# Patient Record
Sex: FEMALE
[De-identification: names, ages, dates, MRNs, and addresses within clinical notes are randomized per-mention and may not be internally consistent; named-entity substitution may affect disease eponyms.]

---

## 2022-04-15 ENCOUNTER — NURSE TRIAGE (OUTPATIENT)
Dept: OTHER | Facility: CLINIC | Age: 52
End: 2022-04-15

## 2022-04-15 NOTE — TELEPHONE ENCOUNTER
Subjective: Caller states \"I tried to contact my PCP office but thy were closed, can I still go to the ER\"     Current Symptoms: Pt advised to seek medical attention within 4 hours, pt PCP office closed, pt advised to go to ED in previous triage. Proceed with alternate dispo. Patient/caller agrees to proceed to [facility] Emergency Department    This triage is a result of a call to [address] [place] [address]. Please do not respond to the triage nurse through this encounter. Any subsequent communication should be directly with the patient. Reason for Disposition  Moran Caller has already spoken with another triager or PCP (or office), and has further questions and triager able to answer questions.     Protocols used: NO CONTACT OR DUPLICATE CONTACT CALL-ADULT-OH

## 2022-04-15 NOTE — TELEPHONE ENCOUNTER
Reason for Disposition   SEVERE back pain (e.g., excruciating, unable to do any normal activities) and not improved after pain medicine and CARE ADVICE    Protocols used: BACK PAIN-ADULT-OH    Subjective: Caller states \"I am in severe pain\"     Current Symptoms: Caller is having severe back pain radiating down L leg. She was seen by PCP 3 days ago and started on steroids and muscle relaxer. Pain is continuing to worsen and caller is having difficulty walking. Onset: 3 days    Associated Symptoms: back pain    Pain Severity: 10/10    Temperature:NA    What has been tried: Steroid, muscle relaxer, heat/cold    LMP: NA Pregnant: NA    Recommended disposition: See today    Care advice provided, patient verbalizes understanding; denies any other questions or concerns; instructed to call back for any new or worsening symptoms. Will call PCP now    This triage is a result of a call to [address] [place] [address]. Please do not respond to the triage nurse through this encounter. Any subsequent communication should be directly with the patient.

## 2022-04-25 ENCOUNTER — NURSE TRIAGE (OUTPATIENT)
Dept: OTHER | Facility: CLINIC | Age: 52
End: 2022-04-25

## 2022-04-25 NOTE — TELEPHONE ENCOUNTER
Subjective: Caller states \"About 2 weeks ago I started having back pain. By Friday I had to go to ER and was found to have a bulging disk and pinched nerve. Am I suppose to see a neurologist, neuro surgeon, or orthopedic doctor. \"     Current Symptoms:   Back pain,  Unable to stand straight  Unable to stand longer than 15 minutes  Pinching pain to buttocks radiates down legs    Onset: 2 weeks ago; gradual    Associated Symptoms: NA    Pain Severity: moderate, interferes with ADLS    Temperature: denies    What has been tried: ibuprofen, went to ER last Friday    LMP: NA Pregnant: NA    Recommended disposition: See HCP within 4 Hours (or PCP triage)    Care advice provided, patient verbalizes understanding; denies any other questions or concerns; instructed to call back for any new or worsening symptoms. Patient/caller agrees to follow-up with PCP     This triage is a result of a call to [address] [place] [address]. Please do not respond to the triage nurse through this encounter. Any subsequent communication should be directly with the patient. Reason for Disposition   [1] SEVERE back pain (e.g., excruciating, unable to do any normal activities) AND [2] not improved 2 hours after pain medicine    Answer Assessment - Initial Assessment Questions  1. REASON FOR CALL or QUESTION: \"What is your reason for calling today? \" or \"How can I best help you? \" or \"What question do you have that I can help answer? \"      *No Answer*    Protocols used: BACK PAIN-ADULT-AH, INFORMATION ONLY CALL - NO TRIAGE-ADULT-OH

## 2023-06-28 DIAGNOSIS — I10 ESSENTIAL (PRIMARY) HYPERTENSION: ICD-10-CM

## 2023-06-28 RX ORDER — LISINOPRIL 20 MG/1
TABLET ORAL
Qty: 90 TABLET | Refills: 1 | Status: SHIPPED | OUTPATIENT
Start: 2023-06-28 | End: 2023-12-19

## 2023-06-28 RX ORDER — AMLODIPINE BESYLATE 5 MG/1
TABLET ORAL
Qty: 90 TABLET | Refills: 1 | Status: SHIPPED | OUTPATIENT
Start: 2023-06-28 | End: 2023-12-19

## 2023-06-30 DIAGNOSIS — Z87.19 PERSONAL HISTORY OF OTHER DISEASES OF THE DIGESTIVE SYSTEM: ICD-10-CM

## 2023-06-30 DIAGNOSIS — J30.2 OTHER SEASONAL ALLERGIC RHINITIS: ICD-10-CM

## 2023-06-30 NOTE — TELEPHONE ENCOUNTER
Requested Prescriptions     Pending Prescriptions Disp Refills    pantoprazole (ProtoNix) 40 mg EC tablet [Pharmacy Med Name: PANTOPRAZOLE SOD DR 40 MG TAB] 90 tablet 1     Sig: TAKE 1 TABLET BY MOUTH EVERY DAY    fluticasone (Flonase) 50 mcg/actuation nasal spray [Pharmacy Med Name: FLUTICASONE PROP 50 MCG SPRAY] 48 mL 1     Sig: SPRAY 2 SPRAYS INTO EACH NOSTRIL EVERY DAY

## 2023-07-03 RX ORDER — PANTOPRAZOLE SODIUM 40 MG/1
TABLET, DELAYED RELEASE ORAL
Qty: 90 TABLET | Refills: 1 | Status: SHIPPED | OUTPATIENT
Start: 2023-07-03 | End: 2024-02-28

## 2023-07-03 RX ORDER — FLUTICASONE PROPIONATE 50 MCG
SPRAY, SUSPENSION (ML) NASAL
Qty: 48 ML | Refills: 1 | Status: SHIPPED | OUTPATIENT
Start: 2023-07-03

## 2023-08-19 DIAGNOSIS — E78.5 HYPERLIPIDEMIA, UNSPECIFIED: ICD-10-CM

## 2023-08-21 RX ORDER — ATORVASTATIN CALCIUM 10 MG/1
10 TABLET, FILM COATED ORAL DAILY
Qty: 90 TABLET | Refills: 1 | Status: SHIPPED | OUTPATIENT
Start: 2023-08-21 | End: 2024-02-28

## 2023-10-26 ENCOUNTER — OFFICE VISIT (OUTPATIENT)
Dept: PRIMARY CARE | Facility: CLINIC | Age: 53
End: 2023-10-26
Payer: COMMERCIAL

## 2023-10-26 VITALS
TEMPERATURE: 98.1 F | WEIGHT: 187 LBS | RESPIRATION RATE: 16 BRPM | HEART RATE: 60 BPM | OXYGEN SATURATION: 98 % | SYSTOLIC BLOOD PRESSURE: 124 MMHG | DIASTOLIC BLOOD PRESSURE: 82 MMHG | BODY MASS INDEX: 31.12 KG/M2

## 2023-10-26 DIAGNOSIS — M67.431 GANGLION CYST OF WRIST, RIGHT: Primary | ICD-10-CM

## 2023-10-26 DIAGNOSIS — J32.9 SINOBRONCHITIS: ICD-10-CM

## 2023-10-26 DIAGNOSIS — J40 SINOBRONCHITIS: ICD-10-CM

## 2023-10-26 PROBLEM — I10 HTN (HYPERTENSION): Status: ACTIVE | Noted: 2023-10-26

## 2023-10-26 PROBLEM — Z20.822 SUSPECTED COVID-19 VIRUS INFECTION: Status: ACTIVE | Noted: 2023-10-26

## 2023-10-26 PROCEDURE — 3074F SYST BP LT 130 MM HG: CPT | Performed by: NURSE PRACTITIONER

## 2023-10-26 PROCEDURE — 99213 OFFICE O/P EST LOW 20 MIN: CPT | Performed by: NURSE PRACTITIONER

## 2023-10-26 PROCEDURE — 4004F PT TOBACCO SCREEN RCVD TLK: CPT | Performed by: NURSE PRACTITIONER

## 2023-10-26 PROCEDURE — 3079F DIAST BP 80-89 MM HG: CPT | Performed by: NURSE PRACTITIONER

## 2023-10-26 RX ORDER — ALBUTEROL SULFATE 90 UG/1
2 AEROSOL, METERED RESPIRATORY (INHALATION) EVERY 4 HOURS PRN
Qty: 6.7 G | Refills: 5 | Status: SHIPPED | OUTPATIENT
Start: 2023-10-26 | End: 2024-10-25

## 2023-10-26 RX ORDER — BENZONATATE 100 MG/1
100 CAPSULE ORAL 3 TIMES DAILY PRN
Qty: 30 CAPSULE | Refills: 0 | Status: SHIPPED | OUTPATIENT
Start: 2023-10-26 | End: 2023-11-25

## 2023-10-26 RX ORDER — CEFDINIR 300 MG/1
300 CAPSULE ORAL 2 TIMES DAILY
Qty: 20 CAPSULE | Refills: 0 | Status: SHIPPED | OUTPATIENT
Start: 2023-10-26 | End: 2023-11-05

## 2023-10-26 RX ORDER — CETIRIZINE HYDROCHLORIDE 10 MG/1
1 TABLET ORAL DAILY
COMMUNITY

## 2023-10-26 RX ORDER — CELECOXIB 200 MG/1
1 CAPSULE ORAL
COMMUNITY
Start: 2022-05-03

## 2023-10-26 RX ORDER — ASCORBIC ACID 125 MG
1 TABLET,CHEWABLE ORAL DAILY
COMMUNITY

## 2023-10-26 ASSESSMENT — ENCOUNTER SYMPTOMS
SORE THROAT: 1
SINUS COMPLAINT: 1
HEADACHES: 1
COUGH: 1
SINUS PRESSURE: 1

## 2023-10-26 NOTE — PROGRESS NOTES
Subjective   Patient ID: Rubi Leal is a 53 y.o. female who presents for Sinus Problem and cyst on right wrist she noticed today at work.    Sinus Problem  This is a new problem. The current episode started in the past 7 days. The problem has been gradually worsening since onset. There has been no fever. Associated symptoms include congestion, coughing, headaches, sinus pressure, sneezing and a sore throat. Pertinent negatives include no ear pain. Treatments tried: Zyrtec and Flonase. The treatment provided no relief.      Today she noticed yellow/green congestion. She took advil this afternoon which helped slightly.     She also noticed a large bruised lump on her left inner wrist yesterday, but never had any injury. She has some discomfort, but no major pain or decreased ROM. Hx of carpal tunnel surgery with Dr. Huffman.    Review of Systems   HENT:  Positive for congestion, sinus pressure, sneezing and sore throat. Negative for ear pain.    Respiratory:  Positive for cough.    Neurological:  Positive for headaches.       Objective   /82 (BP Location: Left arm, Patient Position: Sitting, BP Cuff Size: Adult)   Pulse 60   Temp 36.7 °C (98.1 °F) (Temporal)   Resp 16   Wt 84.8 kg (187 lb)   SpO2 98%   BMI 31.12 kg/m²     Physical Exam  Constitutional:       General: She is not in acute distress.  HENT:      Head: Normocephalic and atraumatic.      Comments: Tenderness on palpation of maxillary sinuses       Right Ear: Ear canal and external ear normal. Tympanic membrane is bulging. Tympanic membrane is not erythematous.      Left Ear: Ear canal and external ear normal. Tympanic membrane is bulging. Tympanic membrane is not erythematous.      Nose: Congestion and rhinorrhea present. Rhinorrhea is clear and purulent.      Mouth/Throat:      Mouth: Mucous membranes are moist.      Pharynx: Oropharynx is clear.   Cardiovascular:      Rate and Rhythm: Normal rate and regular rhythm.   Pulmonary:       Effort: Pulmonary effort is normal.      Breath sounds: Wheezing (clears with cough) present.   Musculoskeletal:      Right wrist: Swelling and tenderness (palmar/medial aspect) present.   Lymphadenopathy:      Cervical: Cervical adenopathy present.   Skin:     General: Skin is warm and dry.   Neurological:      Mental Status: She is alert.         Assessment/Plan   Problem List Items Addressed This Visit       Sinobronchitis    Relevant Medications    cefdinir (Omnicef) 300 mg capsule    benzonatate (Tessalon) 100 mg capsule    albuterol (Proventil HFA) 90 mcg/actuation inhaler     Other Visit Diagnoses       Ganglion cyst of wrist, right    -  Primary          Patient Instructions   Patient to start taking cefdinir as ordered, and tessalon, albuterol, tylenol and ibuprofen as needed. Encouraged to see Dr. Nathanael PARISH for possible injury to wrist. Call the office if no improvement by Monday. Follow-up with PCP in 1-2 weeks as needed.

## 2023-11-03 NOTE — PATIENT INSTRUCTIONS
Patient to start taking cefdinir as ordered, and tessalon, albuterol, tylenol and ibuprofen as needed. Encouraged to see Dr. Nathanael GARCIA for possible injury to wrist. Call the office if no improvement by Monday. Follow-up with PCP in 1-2 weeks as needed.

## 2023-12-19 DIAGNOSIS — I10 ESSENTIAL (PRIMARY) HYPERTENSION: ICD-10-CM

## 2023-12-19 RX ORDER — AMLODIPINE BESYLATE 5 MG/1
TABLET ORAL
Qty: 90 TABLET | Refills: 1 | Status: SHIPPED | OUTPATIENT
Start: 2023-12-19

## 2023-12-19 RX ORDER — LISINOPRIL 20 MG/1
TABLET ORAL
Qty: 90 TABLET | Refills: 1 | Status: SHIPPED | OUTPATIENT
Start: 2023-12-19

## 2024-01-05 ENCOUNTER — OFFICE VISIT (OUTPATIENT)
Dept: PRIMARY CARE | Facility: CLINIC | Age: 54
End: 2024-01-05
Payer: COMMERCIAL

## 2024-01-05 VITALS
SYSTOLIC BLOOD PRESSURE: 138 MMHG | TEMPERATURE: 98 F | DIASTOLIC BLOOD PRESSURE: 82 MMHG | HEART RATE: 78 BPM | WEIGHT: 179 LBS | BODY MASS INDEX: 29.82 KG/M2 | OXYGEN SATURATION: 97 % | RESPIRATION RATE: 18 BRPM | HEIGHT: 65 IN

## 2024-01-05 DIAGNOSIS — J01.90 ACUTE BACTERIAL SINUSITIS: ICD-10-CM

## 2024-01-05 DIAGNOSIS — R05.1 ACUTE COUGH: ICD-10-CM

## 2024-01-05 DIAGNOSIS — H10.31 ACUTE BACTERIAL CONJUNCTIVITIS OF RIGHT EYE: ICD-10-CM

## 2024-01-05 DIAGNOSIS — H60.331 ACUTE SWIMMER'S EAR OF RIGHT SIDE: Primary | ICD-10-CM

## 2024-01-05 DIAGNOSIS — B96.89 ACUTE BACTERIAL SINUSITIS: ICD-10-CM

## 2024-01-05 LAB
POC BINAX EXPIRATION: NORMAL
POC BINAX NOW COVID SERIAL NUMBER: NORMAL
POC RAPID INFLUENZA A: NEGATIVE
POC RAPID STREP: NEGATIVE
POC SARS-COV-2 AG BINAX: NORMAL

## 2024-01-05 PROCEDURE — 87804 INFLUENZA ASSAY W/OPTIC: CPT | Performed by: NURSE PRACTITIONER

## 2024-01-05 PROCEDURE — 87811 SARS-COV-2 COVID19 W/OPTIC: CPT | Performed by: NURSE PRACTITIONER

## 2024-01-05 PROCEDURE — 87880 STREP A ASSAY W/OPTIC: CPT | Performed by: NURSE PRACTITIONER

## 2024-01-05 PROCEDURE — 3079F DIAST BP 80-89 MM HG: CPT | Performed by: NURSE PRACTITIONER

## 2024-01-05 PROCEDURE — 99214 OFFICE O/P EST MOD 30 MIN: CPT | Performed by: NURSE PRACTITIONER

## 2024-01-05 PROCEDURE — 3075F SYST BP GE 130 - 139MM HG: CPT | Performed by: NURSE PRACTITIONER

## 2024-01-05 RX ORDER — CIPROFLOXACIN HYDROCHLORIDE 3 MG/ML
1 SOLUTION/ DROPS OPHTHALMIC 4 TIMES DAILY
Qty: 2.5 ML | Refills: 0 | Status: SHIPPED | OUTPATIENT
Start: 2024-01-05 | End: 2024-01-12

## 2024-01-05 RX ORDER — CIPROFLOXACIN AND DEXAMETHASONE 3; 1 MG/ML; MG/ML
4 SUSPENSION/ DROPS AURICULAR (OTIC) 2 TIMES DAILY
Qty: 7.5 ML | Refills: 0 | Status: SHIPPED | OUTPATIENT
Start: 2024-01-05 | End: 2024-01-12

## 2024-01-05 RX ORDER — AZITHROMYCIN 250 MG/1
TABLET, FILM COATED ORAL
Qty: 6 TABLET | Refills: 0 | Status: SHIPPED | OUTPATIENT
Start: 2024-01-05 | End: 2024-01-10

## 2024-01-05 ASSESSMENT — ENCOUNTER SYMPTOMS
SORE THROAT: 1
COUGH: 1

## 2024-01-05 NOTE — PROGRESS NOTES
"Subjective   Patient ID: Rubi Leal is a 53 y.o. female who presents for Eye Drainage and URI.  URI   This is a new problem. The current episode started today. Associated symptoms include congestion, coughing and a sore throat.    Initially started 2 weeks ago with mild sore throat and cough. Over the last 2 days symptoms are much worse with congestion, right ear pain, right swollen throat sensation, and right eye scratchy/bhumi sensation.  Review of Systems   HENT:  Positive for congestion and sore throat.    Respiratory:  Positive for cough.    Objective   /82   Pulse 78   Temp 36.7 °C (98 °F)   Resp 18   Ht 1.651 m (5' 5\")   Wt 81.2 kg (179 lb)   SpO2 97%   BMI 29.79 kg/m²   Physical Exam  Vitals reviewed.   Constitutional:       Appearance: Normal appearance.   HENT:      Head: Normocephalic.   Eyes:      Conjunctiva/sclera: Conjunctivae normal.   Cardiovascular:      Rate and Rhythm: Normal rate and regular rhythm.      Pulses: Normal pulses.   Pulmonary:      Breath sounds: Normal breath sounds.   Abdominal:      General: Bowel sounds are normal.      Palpations: Abdomen is soft.   Musculoskeletal:      Cervical back: Neck supple.   Lymphadenopathy:      Cervical: Cervical adenopathy (mild anterior cervical and tonsillar adenitis) present.   Skin:     General: Skin is warm and dry.   Neurological:      General: No focal deficit present.      Mental Status: She is alert and oriented to person, place, and time.   Psychiatric:         Mood and Affect: Mood normal.         Thought Content: Thought content normal.     Assessment/Plan   1. Acute swimmer's ear of right side        2. Acute cough  POCT Rapid Strep A manually resulted    POCT Influenza A/B manually resulted    POCT BinaxNOW Covid-19 Ag Card manually resulted      3. Acute bacterial conjunctivitis of right eye        Follow-up as needed if symptoms not improving after treatment. Avoid touching your face or eyes. Use warm water eye " compresses using a clean washcloth each time.

## 2024-01-31 ENCOUNTER — OFFICE VISIT (OUTPATIENT)
Dept: PRIMARY CARE | Facility: CLINIC | Age: 54
End: 2024-01-31
Payer: COMMERCIAL

## 2024-01-31 VITALS
BODY MASS INDEX: 30.12 KG/M2 | HEART RATE: 100 BPM | WEIGHT: 181 LBS | TEMPERATURE: 98.2 F | DIASTOLIC BLOOD PRESSURE: 80 MMHG | OXYGEN SATURATION: 97 % | SYSTOLIC BLOOD PRESSURE: 136 MMHG | RESPIRATION RATE: 18 BRPM

## 2024-01-31 DIAGNOSIS — J01.00 ACUTE NON-RECURRENT MAXILLARY SINUSITIS: Primary | ICD-10-CM

## 2024-01-31 DIAGNOSIS — J02.9 SORE THROAT: ICD-10-CM

## 2024-01-31 DIAGNOSIS — H60.331 ACUTE SWIMMER'S EAR OF RIGHT SIDE: ICD-10-CM

## 2024-01-31 LAB — POC RAPID STREP: NEGATIVE

## 2024-01-31 PROCEDURE — 3075F SYST BP GE 130 - 139MM HG: CPT | Performed by: NURSE PRACTITIONER

## 2024-01-31 PROCEDURE — 99213 OFFICE O/P EST LOW 20 MIN: CPT | Performed by: NURSE PRACTITIONER

## 2024-01-31 PROCEDURE — 3079F DIAST BP 80-89 MM HG: CPT | Performed by: NURSE PRACTITIONER

## 2024-01-31 PROCEDURE — 87651 STREP A DNA AMP PROBE: CPT

## 2024-01-31 PROCEDURE — 87880 STREP A ASSAY W/OPTIC: CPT | Performed by: NURSE PRACTITIONER

## 2024-01-31 RX ORDER — CIPROFLOXACIN AND DEXAMETHASONE 3; 1 MG/ML; MG/ML
4 SUSPENSION/ DROPS AURICULAR (OTIC) 2 TIMES DAILY
Qty: 7.5 ML | Refills: 0 | Status: SHIPPED | OUTPATIENT
Start: 2024-01-31 | End: 2024-02-07

## 2024-01-31 RX ORDER — DOXYCYCLINE 100 MG/1
100 CAPSULE ORAL 2 TIMES DAILY
Qty: 14 CAPSULE | Refills: 0 | Status: SHIPPED | OUTPATIENT
Start: 2024-01-31 | End: 2024-02-07

## 2024-01-31 ASSESSMENT — ENCOUNTER SYMPTOMS
FATIGUE: 0
RHINORRHEA: 1
VOMITING: 0
FEVER: 0
CHILLS: 0
DIARRHEA: 0
SLEEP DISTURBANCE: 0
SORE THROAT: 1
CONSTIPATION: 0
NAUSEA: 0
COUGH: 1
APPETITE CHANGE: 0
ACTIVITY CHANGE: 0

## 2024-01-31 NOTE — PROGRESS NOTES
Subjective   Patient ID: Rubi Leal is a 53 y.o. female who presents for Sore Throat. Declines Covid testing.    Sore throat x two weeks  Ear pain x3-4 days  Nasal congestion/ Drainag  No fever or chills  No GI issus      1-5-24 Took zpack; felt like it helped        Sore Throat   Episode onset: 4 days. There has been no fever. Associated symptoms include congestion, coughing and ear pain. Pertinent negatives include no diarrhea or vomiting.        Review of Systems   Constitutional:  Negative for activity change, appetite change, chills, fatigue and fever.   HENT:  Positive for congestion, ear pain, postnasal drip, rhinorrhea, sneezing and sore throat.    Respiratory:  Positive for cough.    Gastrointestinal:  Negative for constipation, diarrhea, nausea and vomiting.   Psychiatric/Behavioral:  Negative for sleep disturbance.        Objective   /80   Pulse 100   Temp 36.8 °C (98.2 °F)   Resp 18   Wt 82.1 kg (181 lb)   SpO2 97%   BMI 30.12 kg/m²     Physical Exam  Vitals reviewed.   Constitutional:       Appearance: Normal appearance.   HENT:      Head: Normocephalic.      Right Ear: Tympanic membrane, ear canal and external ear normal. Swelling and tenderness present. No drainage.      Left Ear: Tympanic membrane, ear canal and external ear normal.      Nose: Mucosal edema and congestion present.      Right Turbinates: Swollen.      Left Turbinates: Swollen.      Mouth/Throat:      Lips: Pink.      Mouth: Mucous membranes are moist.   Eyes:      Extraocular Movements: Extraocular movements intact.      Conjunctiva/sclera: Conjunctivae normal.      Pupils: Pupils are equal, round, and reactive to light.   Cardiovascular:      Rate and Rhythm: Normal rate and regular rhythm.      Pulses: Normal pulses.      Heart sounds: Normal heart sounds.   Pulmonary:      Effort: Pulmonary effort is normal.      Breath sounds: Normal breath sounds.   Musculoskeletal:      Cervical back: Normal range of motion.    Skin:     General: Skin is warm.      Capillary Refill: Capillary refill takes less than 2 seconds.   Neurological:      General: No focal deficit present.      Mental Status: She is alert and oriented to person, place, and time.   Psychiatric:         Mood and Affect: Mood normal.         Behavior: Behavior normal.         Assessment/Plan   Problem List Items Addressed This Visit             ICD-10-CM    Acute swimmer's ear of right side H60.331     Ear drops given for otits externa; Use as directed  Can use tylenol as needed for pain  Follow up with PCP if not improving  ER for any worsening of symptoms         Relevant Medications    doxycycline (Vibramycin) 100 mg capsule    Acute non-recurrent maxillary sinusitis - Primary J01.00    Relevant Medications    ciprofloxacin-dexamethasone (CiproDEX) otic suspension     Other Visit Diagnoses         Codes    Sore throat     J02.9    Relevant Orders    POCT Rapid Strep A manually resulted (Completed)    Group A Streptococcus, PCR

## 2024-01-31 NOTE — ASSESSMENT & PLAN NOTE
Antibiotics given for acute sinusitis; Take full course until completed  Encouraged daily use of Flonase and Zyrtec for symptom support  Follow up with PCP if not improving over the next 3-4 days  ER for any uncontrolled fevers, SOB, difficulty breathing or worsening of symptoms

## 2024-01-31 NOTE — ASSESSMENT & PLAN NOTE
Ear drops given for otits externa; Use as directed  Can use tylenol as needed for pain  Follow up with PCP if not improving  ER for any worsening of symptoms

## 2024-02-01 LAB — S PYO DNA THROAT QL NAA+PROBE: NOT DETECTED

## 2024-02-05 ENCOUNTER — LAB (OUTPATIENT)
Dept: LAB | Facility: CLINIC | Age: 54
End: 2024-02-05
Payer: COMMERCIAL

## 2024-02-05 DIAGNOSIS — D75.1 POLYCYTHEMIA: ICD-10-CM

## 2024-02-05 LAB
ALBUMIN SERPL BCP-MCNC: 4.9 G/DL (ref 3.4–5)
ALP SERPL-CCNC: 89 U/L (ref 33–110)
ALT SERPL W P-5'-P-CCNC: 20 U/L (ref 7–45)
ANION GAP SERPL CALC-SCNC: 14 MMOL/L (ref 10–20)
AST SERPL W P-5'-P-CCNC: 15 U/L (ref 9–39)
BASOPHILS # BLD AUTO: 0.03 X10*3/UL (ref 0–0.1)
BASOPHILS NFR BLD AUTO: 0.4 %
BILIRUB SERPL-MCNC: 0.4 MG/DL (ref 0–1.2)
BUN SERPL-MCNC: 11 MG/DL (ref 6–23)
CALCIUM SERPL-MCNC: 10 MG/DL (ref 8.6–10.3)
CHLORIDE SERPL-SCNC: 103 MMOL/L (ref 98–107)
CO2 SERPL-SCNC: 28 MMOL/L (ref 21–32)
CREAT SERPL-MCNC: 0.75 MG/DL (ref 0.5–1.05)
EGFRCR SERPLBLD CKD-EPI 2021: >90 ML/MIN/1.73M*2
EOSINOPHIL # BLD AUTO: 0.27 X10*3/UL (ref 0–0.7)
EOSINOPHIL NFR BLD AUTO: 3.2 %
ERYTHROCYTE [DISTWIDTH] IN BLOOD BY AUTOMATED COUNT: 12.3 % (ref 11.5–14.5)
FERRITIN SERPL-MCNC: 68 NG/ML (ref 8–150)
GLUCOSE SERPL-MCNC: 148 MG/DL (ref 74–99)
HCT VFR BLD AUTO: 46.1 % (ref 36–46)
HGB BLD-MCNC: 15.7 G/DL (ref 12–16)
IMM GRANULOCYTES # BLD AUTO: 0.02 X10*3/UL (ref 0–0.7)
IMM GRANULOCYTES NFR BLD AUTO: 0.2 % (ref 0–0.9)
IRON SATN MFR SERPL: 38 % (ref 25–45)
IRON SERPL-MCNC: 88 UG/DL (ref 35–150)
LYMPHOCYTES # BLD AUTO: 2.45 X10*3/UL (ref 1.2–4.8)
LYMPHOCYTES NFR BLD AUTO: 29 %
MCH RBC QN AUTO: 31 PG (ref 26–34)
MCHC RBC AUTO-ENTMCNC: 34.1 G/DL (ref 32–36)
MCV RBC AUTO: 91 FL (ref 80–100)
MONOCYTES # BLD AUTO: 0.53 X10*3/UL (ref 0.1–1)
MONOCYTES NFR BLD AUTO: 6.3 %
NEUTROPHILS # BLD AUTO: 5.16 X10*3/UL (ref 1.2–7.7)
NEUTROPHILS NFR BLD AUTO: 60.9 %
PLATELET # BLD AUTO: 248 X10*3/UL (ref 150–450)
POTASSIUM SERPL-SCNC: 3.7 MMOL/L (ref 3.5–5.3)
PROT SERPL-MCNC: 7.1 G/DL (ref 6.4–8.2)
RBC # BLD AUTO: 5.07 X10*6/UL (ref 4–5.2)
SODIUM SERPL-SCNC: 141 MMOL/L (ref 136–145)
TIBC SERPL-MCNC: 232 UG/DL (ref 240–445)
UIBC SERPL-MCNC: 144 UG/DL (ref 110–370)
WBC # BLD AUTO: 8.5 X10*3/UL (ref 4.4–11.3)

## 2024-02-05 PROCEDURE — 85025 COMPLETE CBC W/AUTO DIFF WBC: CPT

## 2024-02-05 PROCEDURE — 80053 COMPREHEN METABOLIC PANEL: CPT

## 2024-02-05 PROCEDURE — 83540 ASSAY OF IRON: CPT

## 2024-02-05 PROCEDURE — 36415 COLL VENOUS BLD VENIPUNCTURE: CPT

## 2024-02-05 PROCEDURE — 82728 ASSAY OF FERRITIN: CPT

## 2024-02-09 ENCOUNTER — OFFICE VISIT (OUTPATIENT)
Dept: HEMATOLOGY/ONCOLOGY | Facility: CLINIC | Age: 54
End: 2024-02-09
Payer: COMMERCIAL

## 2024-02-09 VITALS
BODY MASS INDEX: 30.34 KG/M2 | TEMPERATURE: 97.5 F | WEIGHT: 182.32 LBS | OXYGEN SATURATION: 96 % | HEART RATE: 62 BPM | SYSTOLIC BLOOD PRESSURE: 148 MMHG | RESPIRATION RATE: 14 BRPM | DIASTOLIC BLOOD PRESSURE: 77 MMHG

## 2024-02-09 DIAGNOSIS — D75.1 POLYCYTHEMIA: ICD-10-CM

## 2024-02-09 PROCEDURE — 3077F SYST BP >= 140 MM HG: CPT | Performed by: PHYSICIAN ASSISTANT

## 2024-02-09 PROCEDURE — 99214 OFFICE O/P EST MOD 30 MIN: CPT | Performed by: PHYSICIAN ASSISTANT

## 2024-02-09 PROCEDURE — 3078F DIAST BP <80 MM HG: CPT | Performed by: PHYSICIAN ASSISTANT

## 2024-02-09 ASSESSMENT — ENCOUNTER SYMPTOMS
HEMATOLOGIC/LYMPHATIC NEGATIVE: 1
VOMITING: 0
NUMBNESS: 0
HEMATURIA: 0
SPEECH DIFFICULTY: 0
NECK STIFFNESS: 0
ABDOMINAL PAIN: 0
DIAPHORESIS: 0
CONFUSION: 0
CARDIOVASCULAR NEGATIVE: 1
DIZZINESS: 0
NECK PAIN: 0
NAUSEA: 0
RESPIRATORY NEGATIVE: 1
CHILLS: 0
DIARRHEA: 0
DIFFICULTY URINATING: 0
APPETITE CHANGE: 0
EYE PROBLEMS: 0
SCLERAL ICTERUS: 0
ABDOMINAL DISTENTION: 0
DECREASED CONCENTRATION: 0
FREQUENCY: 0
CONSTIPATION: 0
CONSTITUTIONAL NEGATIVE: 1
HEADACHES: 0
WOUND: 0

## 2024-02-09 ASSESSMENT — PAIN SCALES - GENERAL: PAINLEVEL: 0-NO PAIN

## 2024-02-09 NOTE — PROGRESS NOTES
Patient ID: Rubi Leal is a 53 y.o. female.  Primary care physician: Dr. Linette Vinson     Interval History:   Mrs. Leal is a 50yo female who is here for hematology consultation.  Dr. Linette Vinson is her PCP.  Dr. Vinson has referred her here for hematology consultation for evaluation and management of polycythemia.    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      Subjective      HPI  -States that she was just off antibiotics for ear infection.   -Continues to smoke about one pack per day.   -denies any other new health concerns.     Social History     Tobacco Use    Smoking status: Every Day     Packs/day: 1     Types: Cigarettes     Passive exposure: Current    Smokeless tobacco: Never   Vaping Use    Vaping Use: Never used        Objective    BMI:   Body mass index is 30.34 kg/m².     Vitals:   Visit Vitals  Smoking Status Every Day        Review of Systems   Constitutional: Negative.  Negative for appetite change, chills and diaphoresis.   HENT:   Negative for lump/mass, mouth sores and nosebleeds.    Eyes:  Negative for eye problems and icterus.   Respiratory: Negative.     Cardiovascular: Negative.    Gastrointestinal:  Negative for abdominal distention, abdominal pain, constipation, diarrhea, nausea and vomiting.   Genitourinary:  Negative for bladder incontinence, difficulty urinating, frequency and hematuria.     Musculoskeletal:  Negative for gait problem, neck pain and neck stiffness.   Skin:  Negative for itching, rash and wound.   Neurological:  Negative for dizziness, gait problem, headaches, numbness and speech difficulty.   Hematological: Negative.    Psychiatric/Behavioral:  Negative for confusion and decreased concentration.         Physical Exam  HENT:      Head: Normocephalic.      Nose: Nose normal.      Mouth/Throat:      Mouth: Mucous membranes are moist.   Eyes:      Pupils: Pupils are equal, round, and reactive to light.   Cardiovascular:      Rate and Rhythm: Normal rate and regular rhythm.      Pulses: Normal pulses.      Heart sounds: Normal heart sounds.   Pulmonary:      Effort: Pulmonary effort is normal.      Breath sounds: Normal breath sounds.   Musculoskeletal:         General: Normal range of motion.   Skin:     General: Skin is warm and dry.   Neurological:      General: No focal deficit present.      Mental Status: She is alert and oriented to person, place, and time.   Psychiatric:         Mood and Affect: Mood normal.         Behavior: Behavior normal.       Labs:  Lab Results   Component Value Date    WBC 8.5 02/05/2024    NEUTROABS 5.16 02/05/2024    IGABSOL 0.02 02/05/2024    LYMPHSABS 2.45 02/05/2024    MONOSABS 0.53 02/05/2024    EOSABS 0.27 02/05/2024    BASOSABS 0.03 02/05/2024    RBC 5.07 02/05/2024    MCV 91 02/05/2024    MCHC 34.1 02/05/2024    HGB 15.7 02/05/2024    HCT 46.1 (H) 02/05/2024     02/05/2024       Lab Results   Component Value Date    CREATININE 0.75 02/05/2024    BUN 11 02/05/2024    EGFR >90 02/05/2024     02/05/2024    K 3.7 02/05/2024     02/05/2024    CO2 28 02/05/2024      Lab Results   Component Value Date    ALT 20 02/05/2024    AST 15 02/05/2024    ALKPHOS 89 02/05/2024    BILITOT 0.4 02/05/2024      Lab Results   Component Value Date    TSH 0.89 03/02/2021     Lab Results   Component Value Date    TSH 0.89 03/02/2021    THYROIDPAB 58  03/02/2021     Lab Results   Component Value Date    IRON 88 02/05/2024    TIBC 232 (L) 02/05/2024    FERRITIN 68 02/05/2024      Performance Status:  Asymptomatic    Assessment/Plan      1)polycythemia  --since 2013 she has had polycythemia  -the 2 main diagnoses to consider would be hereditary hemochromatosis and primary polycythemia vera  -we checked labs today: creatinine 0.6, AST 13, ALT 20, alk phos 56  -, EPO 7.7  wbc 9.7, hgb 16.0, plt 239,000, ANC 7200  -TIBC 235, sat 17%, ferritin 28  -PAWAN 2 negative, BCR-ABL negative  -MP and calreticulin pending  -hereditary hemochromatosis mutation panel pending  -if all above negative, then because she has been smoking for 30 years now, she probably has secondary polycythemia secondary to smoking/chronic hypoxia    2/5/2021 here for interval followup  -on initial visit, 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  -hgb was 15 on Jan 26  -today we will check COMP (to monitor liver function) and iron panel + ferritin (iron indices are monitored in setting of hemochromatosis)  -today's labs reviewed- saturation 34%, ferritin 76  -reviewed criteria for initiation of therapeutic phlebotomy--when saturation >60% and/or ferritin >500  -pt does not meet criteria--so will just observe annually for now  -she knows to come 1-2 days prior to each office visit to get her labs drawn  -limited physical exam was performed today  -25 minutes were spent directly face-to-face with patient, 15 minutes were spent in reviewing data ( labs), reconciling meds, ordering future labs and appointment,  as well as documenting and finalizing clinical information in the medical record for this patient for total of 40 minutes     2/4/2022 here for interval follow up via telephone  -had labs drawn a few days ago including CBC, COMP, iron panel + ferritin  -reviewed results--ferritin 44, sat 37%  -discussed implications--as long as ferritin <500 and sat <60%, initiation of  therapeutic phlebotomy would not be necessary yet  -will continue to follow annually  -20 minutes total spent    2/3/2023 Here for interval followup via phone.   -Labs done on 2/1/23: WBC 7.6, ANC 4.20, Hgb 15.2, MCV 91, platelets 275,000, ferritin 49, iron saturation 48%, ALT 19, AST 16  -Reviewed criteria of therapeutic phlebotomy with patient again today: ferritin above 500 and/or iron saturation above 60%. Patient does not meet the criteria yet.   -Advised patient to avoid iron supplements.   -I will see her back in one year with labs done a few days prior to the next visit.     2/9/2024 Here for interval follow up  -Patient has been seeing us annually for hereditary hematochromatosis.   -Labs done on 2/5/24: Hgb 15.7, MCV 91, ferritin 68, iron saturation 38%, AST 15, ALT 20  -Patient does not meet the criteria for phlebotomy yet.   -Patient continues to smoke about one pack per day.   -Advised patient to stop smoking,   -We will continue to see patient back yearly.   -She will have CBC, CMP, iron panel and ferritin done a few days prior to the next visit.       Problem List Items Addressed This Visit    None  Visit Diagnoses         Codes    Polycythemia     D75.1    Relevant Orders    CBC and Auto Differential (Completed)    Comprehensive Metabolic Panel (Completed)    Ferritin (Completed)    Iron and TIBC (Completed)    Iron and TIBC    Ferritin    CBC and Auto Differential    Comprehensive Metabolic Panel    Clinic Appointment Request Follow Up; HANS ARCHER; [id number]                 Hans Archer PA-C

## 2024-02-28 DIAGNOSIS — Z87.19 PERSONAL HISTORY OF OTHER DISEASES OF THE DIGESTIVE SYSTEM: ICD-10-CM

## 2024-02-28 DIAGNOSIS — E78.5 HYPERLIPIDEMIA, UNSPECIFIED: ICD-10-CM

## 2024-02-28 RX ORDER — PANTOPRAZOLE SODIUM 40 MG/1
TABLET, DELAYED RELEASE ORAL
Qty: 90 TABLET | Refills: 1 | Status: SHIPPED | OUTPATIENT
Start: 2024-02-28

## 2024-02-28 RX ORDER — ATORVASTATIN CALCIUM 10 MG/1
10 TABLET, FILM COATED ORAL DAILY
Qty: 90 TABLET | Refills: 1 | Status: SHIPPED | OUTPATIENT
Start: 2024-02-28

## 2024-03-25 ENCOUNTER — APPOINTMENT (OUTPATIENT)
Dept: RADIOLOGY | Facility: HOSPITAL | Age: 54
End: 2024-03-25
Payer: COMMERCIAL

## 2024-03-25 ENCOUNTER — HOSPITAL ENCOUNTER (EMERGENCY)
Facility: HOSPITAL | Age: 54
Discharge: HOME | End: 2024-03-25
Attending: EMERGENCY MEDICINE
Payer: COMMERCIAL

## 2024-03-25 VITALS
TEMPERATURE: 97 F | BODY MASS INDEX: 29.99 KG/M2 | OXYGEN SATURATION: 95 % | WEIGHT: 180 LBS | HEIGHT: 65 IN | SYSTOLIC BLOOD PRESSURE: 182 MMHG | HEART RATE: 90 BPM | RESPIRATION RATE: 18 BRPM | DIASTOLIC BLOOD PRESSURE: 87 MMHG

## 2024-03-25 DIAGNOSIS — R10.30 LOWER ABDOMINAL PAIN: Primary | ICD-10-CM

## 2024-03-25 DIAGNOSIS — R11.2 NAUSEA AND VOMITING, UNSPECIFIED VOMITING TYPE: ICD-10-CM

## 2024-03-25 LAB
ALBUMIN SERPL BCP-MCNC: 5.4 G/DL (ref 3.4–5)
ALP SERPL-CCNC: 97 U/L (ref 33–110)
ALT SERPL W P-5'-P-CCNC: 22 U/L (ref 7–45)
ANION GAP SERPL CALC-SCNC: 19 MMOL/L (ref 10–20)
APPEARANCE UR: CLEAR
AST SERPL W P-5'-P-CCNC: 18 U/L (ref 9–39)
BASOPHILS # BLD AUTO: 0.06 X10*3/UL (ref 0–0.1)
BASOPHILS NFR BLD AUTO: 0.3 %
BILIRUB SERPL-MCNC: 0.6 MG/DL (ref 0–1.2)
BILIRUB UR STRIP.AUTO-MCNC: NEGATIVE MG/DL
BUN SERPL-MCNC: 6 MG/DL (ref 6–23)
CALCIUM SERPL-MCNC: 10 MG/DL (ref 8.6–10.3)
CHLORIDE SERPL-SCNC: 103 MMOL/L (ref 98–107)
CO2 SERPL-SCNC: 23 MMOL/L (ref 21–32)
COLOR UR: ABNORMAL
CREAT SERPL-MCNC: 0.66 MG/DL (ref 0.5–1.05)
EGFRCR SERPLBLD CKD-EPI 2021: >90 ML/MIN/1.73M*2
EOSINOPHIL # BLD AUTO: 0.01 X10*3/UL (ref 0–0.7)
EOSINOPHIL NFR BLD AUTO: 0 %
ERYTHROCYTE [DISTWIDTH] IN BLOOD BY AUTOMATED COUNT: 12.8 % (ref 11.5–14.5)
FLUAV RNA RESP QL NAA+PROBE: NOT DETECTED
FLUBV RNA RESP QL NAA+PROBE: NOT DETECTED
GLUCOSE SERPL-MCNC: 228 MG/DL (ref 74–99)
GLUCOSE UR STRIP.AUTO-MCNC: ABNORMAL MG/DL
HCT VFR BLD AUTO: 52.4 % (ref 36–46)
HGB BLD-MCNC: 17.2 G/DL (ref 12–16)
IMM GRANULOCYTES # BLD AUTO: 0.11 X10*3/UL (ref 0–0.7)
IMM GRANULOCYTES NFR BLD AUTO: 0.5 % (ref 0–0.9)
KETONES UR STRIP.AUTO-MCNC: ABNORMAL MG/DL
LEUKOCYTE ESTERASE UR QL STRIP.AUTO: NEGATIVE
LIPASE SERPL-CCNC: 6 U/L (ref 9–82)
LYMPHOCYTES # BLD AUTO: 0.97 X10*3/UL (ref 1.2–4.8)
LYMPHOCYTES NFR BLD AUTO: 4.7 %
MCH RBC QN AUTO: 29.7 PG (ref 26–34)
MCHC RBC AUTO-ENTMCNC: 32.8 G/DL (ref 32–36)
MCV RBC AUTO: 90 FL (ref 80–100)
MONOCYTES # BLD AUTO: 0.61 X10*3/UL (ref 0.1–1)
MONOCYTES NFR BLD AUTO: 3 %
NEUTROPHILS # BLD AUTO: 18.73 X10*3/UL (ref 1.2–7.7)
NEUTROPHILS NFR BLD AUTO: 91.5 %
NITRITE UR QL STRIP.AUTO: NEGATIVE
NRBC BLD-RTO: 0 /100 WBCS (ref 0–0)
PH UR STRIP.AUTO: 7 [PH]
PLATELET # BLD AUTO: 336 X10*3/UL (ref 150–450)
POTASSIUM SERPL-SCNC: 3.9 MMOL/L (ref 3.5–5.3)
PROT SERPL-MCNC: 8.1 G/DL (ref 6.4–8.2)
PROT UR STRIP.AUTO-MCNC: ABNORMAL MG/DL
RBC # BLD AUTO: 5.8 X10*6/UL (ref 4–5.2)
RBC # UR STRIP.AUTO: ABNORMAL /UL
RBC #/AREA URNS AUTO: NORMAL /HPF
SARS-COV-2 RNA RESP QL NAA+PROBE: NOT DETECTED
SODIUM SERPL-SCNC: 141 MMOL/L (ref 136–145)
SP GR UR STRIP.AUTO: 1.01
SQUAMOUS #/AREA URNS AUTO: NORMAL /HPF
UROBILINOGEN UR STRIP.AUTO-MCNC: <2 MG/DL
WBC # BLD AUTO: 20.5 X10*3/UL (ref 4.4–11.3)
WBC #/AREA URNS AUTO: NORMAL /HPF

## 2024-03-25 PROCEDURE — 2550000001 HC RX 255 CONTRASTS: Performed by: EMERGENCY MEDICINE

## 2024-03-25 PROCEDURE — 99284 EMERGENCY DEPT VISIT MOD MDM: CPT | Mod: 25

## 2024-03-25 PROCEDURE — 2500000004 HC RX 250 GENERAL PHARMACY W/ HCPCS (ALT 636 FOR OP/ED)

## 2024-03-25 PROCEDURE — 96376 TX/PRO/DX INJ SAME DRUG ADON: CPT

## 2024-03-25 PROCEDURE — 96375 TX/PRO/DX INJ NEW DRUG ADDON: CPT

## 2024-03-25 PROCEDURE — 83690 ASSAY OF LIPASE: CPT

## 2024-03-25 PROCEDURE — 99285 EMERGENCY DEPT VISIT HI MDM: CPT | Performed by: EMERGENCY MEDICINE

## 2024-03-25 PROCEDURE — 85025 COMPLETE CBC W/AUTO DIFF WBC: CPT

## 2024-03-25 PROCEDURE — 80053 COMPREHEN METABOLIC PANEL: CPT

## 2024-03-25 PROCEDURE — 96361 HYDRATE IV INFUSION ADD-ON: CPT

## 2024-03-25 PROCEDURE — 87636 SARSCOV2 & INF A&B AMP PRB: CPT

## 2024-03-25 PROCEDURE — 81001 URINALYSIS AUTO W/SCOPE: CPT

## 2024-03-25 PROCEDURE — 96374 THER/PROPH/DIAG INJ IV PUSH: CPT | Mod: 59

## 2024-03-25 PROCEDURE — 74177 CT ABD & PELVIS W/CONTRAST: CPT

## 2024-03-25 PROCEDURE — 36415 COLL VENOUS BLD VENIPUNCTURE: CPT

## 2024-03-25 PROCEDURE — 74177 CT ABD & PELVIS W/CONTRAST: CPT | Performed by: STUDENT IN AN ORGANIZED HEALTH CARE EDUCATION/TRAINING PROGRAM

## 2024-03-25 RX ORDER — ONDANSETRON HYDROCHLORIDE 2 MG/ML
4 INJECTION, SOLUTION INTRAVENOUS ONCE
Status: COMPLETED | OUTPATIENT
Start: 2024-03-25 | End: 2024-03-25

## 2024-03-25 RX ORDER — MORPHINE SULFATE 4 MG/ML
4 INJECTION, SOLUTION INTRAMUSCULAR; INTRAVENOUS ONCE
Status: COMPLETED | OUTPATIENT
Start: 2024-03-25 | End: 2024-03-25

## 2024-03-25 RX ORDER — ONDANSETRON 4 MG/1
4 TABLET, ORALLY DISINTEGRATING ORAL EVERY 8 HOURS PRN
Qty: 15 TABLET | Refills: 0 | Status: SHIPPED | OUTPATIENT
Start: 2024-03-25

## 2024-03-25 RX ADMIN — SODIUM CHLORIDE, POTASSIUM CHLORIDE, SODIUM LACTATE AND CALCIUM CHLORIDE 1000 ML: 600; 310; 30; 20 INJECTION, SOLUTION INTRAVENOUS at 22:30

## 2024-03-25 RX ADMIN — MORPHINE SULFATE 4 MG: 4 INJECTION, SOLUTION INTRAMUSCULAR; INTRAVENOUS at 19:12

## 2024-03-25 RX ADMIN — ONDANSETRON 4 MG: 2 INJECTION INTRAMUSCULAR; INTRAVENOUS at 19:12

## 2024-03-25 RX ADMIN — MORPHINE SULFATE 4 MG: 4 INJECTION, SOLUTION INTRAMUSCULAR; INTRAVENOUS at 22:30

## 2024-03-25 RX ADMIN — ONDANSETRON 4 MG: 2 INJECTION INTRAMUSCULAR; INTRAVENOUS at 22:30

## 2024-03-25 RX ADMIN — SODIUM CHLORIDE 1000 ML: 9 INJECTION, SOLUTION INTRAVENOUS at 19:12

## 2024-03-25 RX ADMIN — IOHEXOL 75 ML: 350 INJECTION, SOLUTION INTRAVENOUS at 20:26

## 2024-03-25 ASSESSMENT — LIFESTYLE VARIABLES
HAVE YOU EVER FELT YOU SHOULD CUT DOWN ON YOUR DRINKING: NO
EVER FELT BAD OR GUILTY ABOUT YOUR DRINKING: NO
EVER HAD A DRINK FIRST THING IN THE MORNING TO STEADY YOUR NERVES TO GET RID OF A HANGOVER: NO
TOTAL SCORE: 0
HAVE PEOPLE ANNOYED YOU BY CRITICIZING YOUR DRINKING: NO

## 2024-03-25 ASSESSMENT — PAIN SCALES - GENERAL
PAINLEVEL_OUTOF10: 10 - WORST POSSIBLE PAIN
PAINLEVEL_OUTOF10: 7
PAINLEVEL_OUTOF10: 8
PAINLEVEL_OUTOF10: 5 - MODERATE PAIN

## 2024-03-25 ASSESSMENT — PAIN DESCRIPTION - LOCATION
LOCATION: ABDOMEN

## 2024-03-25 ASSESSMENT — COLUMBIA-SUICIDE SEVERITY RATING SCALE - C-SSRS
1. IN THE PAST MONTH, HAVE YOU WISHED YOU WERE DEAD OR WISHED YOU COULD GO TO SLEEP AND NOT WAKE UP?: NO
2. HAVE YOU ACTUALLY HAD ANY THOUGHTS OF KILLING YOURSELF?: NO
6. HAVE YOU EVER DONE ANYTHING, STARTED TO DO ANYTHING, OR PREPARED TO DO ANYTHING TO END YOUR LIFE?: NO

## 2024-03-25 ASSESSMENT — PAIN - FUNCTIONAL ASSESSMENT
PAIN_FUNCTIONAL_ASSESSMENT: 0-10

## 2024-03-25 ASSESSMENT — PAIN DESCRIPTION - PAIN TYPE: TYPE: ACUTE PAIN

## 2024-03-25 NOTE — ED PROVIDER NOTES
EMERGENCY DEPARTMENT ENCOUNTER      Pt Name: Rubi Leal  MRN: [id number]  Birthdate 1970  Date of evaluation: 3/25/2024  Provider: Gus Angulo MD    CHIEF COMPLAINT       Chief Complaint   Patient presents with    Abdominal Pain     Abd pain, n/v, diarrhea since she woke up at 0800 today; denies CP         HISTORY OF PRESENT ILLNESS    HPI  Patient is a 53-year-old female presenting with abdominal pain.  Acute onset approximately 8:00 this morning when she woke up.  Pain is 8/10, sharp, radiating along her belt line, without consistent leaving or exacerbating factors.  She reports approximately 10 episodes of nonbloody emesis and inability to tolerate oral intake.  She has had several episodes of nonbloody diarrhea.  She reports chills but denies fever, URI symptoms, chest pain, shortness of breath    Nursing Notes were reviewed.    PAST MEDICAL HISTORY     Past Medical History:   Diagnosis Date    Calculus of kidney     Multiple kidney stones    Encounter for follow-up examination after completed treatment for conditions other than malignant neoplasm 11/17/2021    Hospital discharge follow-up    Generalized abdominal pain 12/28/2021    Generalized abdominal pain    Menopausal and female climacteric states 12/17/2019    Perimenopausal    Osteophyte, vertebrae     Osteophyte of spine    Other muscle spasm 02/04/2022    Trapezius muscle spasm    Personal history of other diseases of the musculoskeletal system and connective tissue     History of degenerative disc disease    Personal history of other diseases of the nervous system and sense organs     History of tension headache    Personal history of other specified conditions     History of abdominal pain    Serous retinal detachment, bilateral     Bilateral retinal detachment    Strain of muscle, fascia and tendon at neck level, initial encounter 03/09/2022    Strain of sternocleidomastoid muscle, initial encounter    Strain of other muscles, fascia and  tendons at shoulder and upper arm level, left arm, initial encounter 03/09/2022    Strain of left trapezius muscle, initial encounter         SURGICAL HISTORY       Past Surgical History:   Procedure Laterality Date    OTHER SURGICAL HISTORY  09/04/2019    Tonsillectomy with adenoidectomy    OTHER SURGICAL HISTORY  09/04/2019    Lithotripsy    OTHER SURGICAL HISTORY  09/04/2019    Carpal tunnel surgery    OTHER SURGICAL HISTORY  02/03/2021    Retinal detachment repair         CURRENT MEDICATIONS       Previous Medications    ALBUTEROL (PROVENTIL HFA) 90 MCG/ACTUATION INHALER    Inhale 2 puffs every 4 hours if needed for wheezing or shortness of breath.    AMLODIPINE (NORVASC) 5 MG TABLET    TAKE 1 TABLET BY MOUTH EVERY DAY    ATORVASTATIN (LIPITOR) 10 MG TABLET    TAKE 1 TABLET BY MOUTH EVERY DAY    CELECOXIB (CELEBREX) 200 MG CAPSULE    Take 1 capsule (200 mg) by mouth once daily.    CETIRIZINE (ZYRTEC) 10 MG TABLET    Take 1 tablet (10 mg) by mouth once daily.    CHOLECALCIFEROL, VITAMIN D3, 25 MCG (1,000 UNIT) TABLET,CHEWABLE    Chew 1 tablet (25 mcg) once daily.    FLUTICASONE (FLONASE) 50 MCG/ACTUATION NASAL SPRAY    SPRAY 2 SPRAYS INTO EACH NOSTRIL EVERY DAY    LISINOPRIL 20 MG TABLET    TAKE 1 TABLET BY MOUTH EVERY DAY    PANTOPRAZOLE (PROTONIX) 40 MG EC TABLET    TAKE 1 TABLET BY MOUTH EVERY DAY       ALLERGIES     Cefdinir, Erythromycin, and Penicillins    FAMILY HISTORY     No family history on file.       SOCIAL HISTORY       Social History     Socioeconomic History    Marital status:      Spouse name: None    Number of children: None    Years of education: None    Highest education level: None   Occupational History    None   Tobacco Use    Smoking status: Every Day     Packs/day: 1     Types: Cigarettes     Passive exposure: Current    Smokeless tobacco: Never   Vaping Use    Vaping Use: Never used   Substance and Sexual Activity    Alcohol use: None    Drug use: None    Sexual activity: None    Other Topics Concern    None   Social History Narrative    None     Social Determinants of Health     Financial Resource Strain: Not on file   Food Insecurity: Not on file   Transportation Needs: Not on file   Physical Activity: Not on file   Stress: Not on file   Social Connections: Not on file   Intimate Partner Violence: Not on file   Housing Stability: Not on file       SCREENINGS                        PHYSICAL EXAM    (up to 7 for level 4, 8 or more for level 5)     ED Triage Vitals [03/25/24 1818]   Temperature Heart Rate Respirations BP   36.1 °C (97 °F) 87 18 (!) 181/84      Pulse Ox Temp src Heart Rate Source Patient Position   99 % -- -- Sitting      BP Location FiO2 (%)     Right arm --       Physical Exam  Constitutional:       Appearance: She is ill-appearing.      Comments:   Uncomfortable lying in bed   HENT:      Head: Normocephalic and atraumatic.      Mouth/Throat:      Pharynx: Oropharynx is clear.      Comments:  dry mucous membranes  Eyes:      General: No scleral icterus.     Pupils: Pupils are equal, round, and reactive to light.   Cardiovascular:      Rate and Rhythm: Normal rate and regular rhythm.      Heart sounds: Normal heart sounds.   Pulmonary:      Effort: Pulmonary effort is normal. No respiratory distress.      Breath sounds: Normal breath sounds.   Abdominal:      General: There is no distension.      Palpations: Abdomen is soft.      Tenderness: There is abdominal tenderness in the right lower quadrant and left lower quadrant. There is no guarding or rebound.   Skin:     General: Skin is warm and dry.   Neurological:      General: No focal deficit present.   Psychiatric:         Mood and Affect: Mood is anxious.          DIAGNOSTIC RESULTS     LABS:  Labs Reviewed   CBC WITH AUTO DIFFERENTIAL - Abnormal       Result Value    WBC 20.5 (*)     nRBC 0.0      RBC 5.80 (*)     Hemoglobin 17.2 (*)     Hematocrit 52.4 (*)     MCV 90      MCH 29.7      MCHC 32.8      RDW 12.8       Platelets 336      Neutrophils % 91.5      Immature Granulocytes %, Automated 0.5      Lymphocytes % 4.7      Monocytes % 3.0      Eosinophils % 0.0      Basophils % 0.3      Neutrophils Absolute 18.73 (*)     Immature Granulocytes Absolute, Automated 0.11      Lymphocytes Absolute 0.97 (*)     Monocytes Absolute 0.61      Eosinophils Absolute 0.01      Basophils Absolute 0.06     COMPREHENSIVE METABOLIC PANEL - Abnormal    Glucose 228 (*)     Sodium 141      Potassium 3.9      Chloride 103      Bicarbonate 23      Anion Gap 19      Urea Nitrogen 6      Creatinine 0.66      eGFR >90      Calcium 10.0      Albumin 5.4 (*)     Alkaline Phosphatase 97      Total Protein 8.1      AST 18      Bilirubin, Total 0.6      ALT 22     URINALYSIS WITH REFLEX CULTURE AND MICROSCOPIC - Abnormal    Color, Urine Straw      Appearance, Urine Clear      Specific Gravity, Urine 1.011      pH, Urine 7.0      Protein, Urine 30 (1+) (*)     Glucose, Urine 150 (2+) (*)     Blood, Urine SMALL (1+) (*)     Ketones, Urine 5 (TRACE) (*)     Bilirubin, Urine NEGATIVE      Urobilinogen, Urine <2.0      Nitrite, Urine NEGATIVE      Leukocyte Esterase, Urine NEGATIVE     LIPASE - Abnormal    Lipase 6 (*)     Narrative:     Venipuncture immediately after or during the administration of Metamizole may lead to falsely low results. Testing should be performed immediately prior to Metamizole dosing.   SARS-COV-2 AND INFLUENZA A/B PCR - Normal    Flu A Result Not Detected      Flu B Result Not Detected      Coronavirus 2019, PCR Not Detected      Narrative:     This assay has received FDA Emergency Use Authorization (EUA) and  is only authorized for the duration of time that circumstances exist to justify the authorization of the emergency use of in vitro diagnostic tests for the detection of SARS-CoV-2 virus and/or diagnosis of COVID-19 infection under section 564(b)(1) of the Act, 21 U.S.C. 360bbb-3(b)(1). Testing for SARS-CoV-2 is only recommended  for patients who meet current clinical and/or epidemiological criteria as defined by federal, state, or local public health directives. This assay is an in vitro diagnostic nucleic acid amplification test for the qualitative detection of SARS-CoV-2, Influenza A, and Influenza B from nasopharyngeal specimens and has been validated for use at [facility]. Negative results do not preclude COVID-19 infections or Influenza A/B infections, and should not be used as the sole basis for diagnosis, treatment, or other management decisions. If Influenza A/B and RSV PCR results are negative, testing for Parainfluenza virus, Adenovirus and Metapneumovirus is routinely performed for [facility] pediatric oncology and intensive care inpatients, and is available on other patients by placing an add-on request.    URINALYSIS WITH REFLEX CULTURE AND MICROSCOPIC    Narrative:     The following orders were created for panel order Urinalysis with Reflex Culture and Microscopic.  Procedure                               Abnormality         Status                     ---------                               -----------         ------                     Urinalysis with Reflex C...[[id number]]  Abnormal            Final result               Extra Urine Gray Tube[[id number]]                            In process                   Please view results for these tests on the individual orders.   EXTRA URINE GRAY TUBE   URINALYSIS MICROSCOPIC WITH REFLEX CULTURE    WBC, Urine 1-5      RBC, Urine 1-2      Squamous Epithelial Cells, Urine 1-9 (SPARSE)         All other labs were within normal range or not returned as of this dictation.    Imaging  CT abdomen pelvis w IV contrast   Final Result   1.  No acute findings in the abdomen and pelvis.   2. Hepatic steatosis.             MACRO:   None        Signed by: Matthias Marino 3/25/2024 8:59 PM   Dictation workstation:   [id number]           Procedures  Procedures     EMERGENCY  DEPARTMENT COURSE/MDM:     Diagnoses as of 03/25/24 2334   Lower abdominal pain   Nausea and vomiting, unspecified vomiting type        Medical Decision Making    History obtained for the patient.  Records including labs, imaging, notes reviewed.  Presentation concerning for possible enteritis, colitis, appendicitis.  Abdominal pain workup was initiated.  Patient was given dose of morphine, Zofran, intravenous fluid with temporary resolution of her symptoms.  In the meantime, labs were sent.  CMP notable for hyperglycemia of 228.    Hematology notable for leukocytosis 20.5 but also elevated hemoglobin of 17.2.  This may be due to hemoconcentration  rather than indication of infection or inflammatory process.  Urinalysis with small blood only.  CT demonstrated no acute findings.  No clinical evidence of infection.  Patient is afebrile, normal vital signs, no evidence for sepsis or septic shock.  This was discussed at length with the patient and her .  This is reportedly happened before but is never not resolved with pain control and Zofran.  Patient was redosed with resolution of her symptoms and subsequently passed a p.o. challenge.  She was discharged home in satisfactory condition with a prescription for Zofran and a referral to gastroenterology.  All questions answered and return precautions discussed.    Patient and or family in agreement and understanding of treatment plan.  All questions answered.      I reviewed the case with the attending ED physician. The attending ED physician agrees with the plan. Patient and/or patient´s representative was counseled regarding labs, imaging, likely diagnosis, and plan. All questions were answered.    ED Medications administered this visit:    Medications   ondansetron (Zofran) injection 4 mg (4 mg intravenous Given 3/25/24 1912)   morphine injection 4 mg (4 mg intravenous Given 3/25/24 1912)   sodium chloride 0.9 % bolus 1,000 mL (0 mL intravenous Stopped 3/25/24  2011)   iohexol (OMNIPaque) 350 mg iodine/mL solution 75 mL (75 mL intravenous Given 3/25/24 2026)   ondansetron (Zofran) injection 4 mg (4 mg intravenous Given 3/25/24 2230)   morphine injection 4 mg (4 mg intravenous Given 3/25/24 2230)   lactated Ringer's bolus 1,000 mL (1,000 mL intravenous New Bag 3/25/24 2230)       New Prescriptions from this visit:    New Prescriptions    ONDANSETRON ODT (ZOFRAN-ODT) 4 MG DISINTEGRATING TABLET    Take 1 tablet (4 mg) by mouth every 8 hours if needed for nausea or vomiting for up to 15 doses.       Follow-up:  Grace NEWELL MD  [id number] [address] [address]  [facility] [address] [phone]    Schedule an appointment as soon as possible for a visit           Final Impression:   1. Lower abdominal pain    2. Nausea and vomiting, unspecified vomiting type          (Please note that portions of this note were completed with a voice recognition program.  Efforts were made to edit the dictations but occasionally words are mis-transcribed.)     Gus Angulo MD  Resident  03/25/24 0816    The patient was seen by the resident/fellow.  I have personally performed a substantive portion of the encounter.  I have seen and examined the patient; agree with the workup, evaluation, MDM, management and diagnosis.  The care plan has been discussed with the resident; I have reviewed the resident’s note and agree with the documented findings.       Alberto Hsu, DO  03/31/24 8664

## 2024-03-26 LAB — HOLD SPECIMEN: NORMAL

## 2024-03-26 NOTE — DISCHARGE INSTRUCTIONS
You were evaluated for abdominal pain.  Your CT scan did not demonstrate any acute findings to explain her symptoms.  We recommend that you return to gastroenterology for further evaluation.  You may use Zofran as needed for nausea and vomiting.  If you develop uncontrollable pain, intractable vomiting, or other worrisome symptoms, return to the ER.

## 2024-04-01 DIAGNOSIS — Z12.39 ENCOUNTER FOR SCREENING FOR MALIGNANT NEOPLASM OF BREAST, UNSPECIFIED SCREENING MODALITY: ICD-10-CM

## 2024-04-23 ENCOUNTER — HOSPITAL ENCOUNTER (OUTPATIENT)
Dept: RADIOLOGY | Facility: CLINIC | Age: 54
Discharge: HOME | End: 2024-04-23
Payer: COMMERCIAL

## 2024-04-23 VITALS — BODY MASS INDEX: 30.32 KG/M2 | WEIGHT: 182 LBS | HEIGHT: 65 IN

## 2024-04-23 DIAGNOSIS — Z12.39 ENCOUNTER FOR SCREENING FOR MALIGNANT NEOPLASM OF BREAST, UNSPECIFIED SCREENING MODALITY: ICD-10-CM

## 2024-04-23 PROCEDURE — 77063 BREAST TOMOSYNTHESIS BI: CPT | Performed by: RADIOLOGY

## 2024-04-23 PROCEDURE — 77067 SCR MAMMO BI INCL CAD: CPT | Performed by: RADIOLOGY

## 2024-04-23 PROCEDURE — 77067 SCR MAMMO BI INCL CAD: CPT

## 2024-05-16 ENCOUNTER — APPOINTMENT (OUTPATIENT)
Dept: GASTROENTEROLOGY | Facility: CLINIC | Age: 54
End: 2024-05-16
Payer: COMMERCIAL

## 2024-06-04 ENCOUNTER — OFFICE VISIT (OUTPATIENT)
Dept: GASTROENTEROLOGY | Facility: CLINIC | Age: 54
End: 2024-06-04
Payer: COMMERCIAL

## 2024-06-04 ENCOUNTER — HOSPITAL ENCOUNTER (OUTPATIENT)
Dept: RADIOLOGY | Facility: CLINIC | Age: 54
Discharge: HOME | End: 2024-06-04
Payer: COMMERCIAL

## 2024-06-04 ENCOUNTER — LAB (OUTPATIENT)
Dept: LAB | Facility: LAB | Age: 54
End: 2024-06-04
Payer: COMMERCIAL

## 2024-06-04 VITALS
RESPIRATION RATE: 18 BRPM | HEART RATE: 63 BPM | OXYGEN SATURATION: 96 % | BODY MASS INDEX: 29.82 KG/M2 | WEIGHT: 179 LBS | HEIGHT: 65 IN | SYSTOLIC BLOOD PRESSURE: 149 MMHG | DIASTOLIC BLOOD PRESSURE: 79 MMHG

## 2024-06-04 DIAGNOSIS — F17.210 CIGARETTE SMOKER: ICD-10-CM

## 2024-06-04 DIAGNOSIS — K58.2 IRRITABLE BOWEL SYNDROME WITH BOTH CONSTIPATION AND DIARRHEA: ICD-10-CM

## 2024-06-04 DIAGNOSIS — R19.5 DARK STOOLS: ICD-10-CM

## 2024-06-04 DIAGNOSIS — R10.10 UPPER ABDOMINAL PAIN: ICD-10-CM

## 2024-06-04 DIAGNOSIS — R19.8 ALTERNATING CONSTIPATION AND DIARRHEA: ICD-10-CM

## 2024-06-04 DIAGNOSIS — R11.2 NAUSEA AND VOMITING IN ADULT: ICD-10-CM

## 2024-06-04 DIAGNOSIS — K58.2 IRRITABLE BOWEL SYNDROME WITH BOTH CONSTIPATION AND DIARRHEA: Primary | ICD-10-CM

## 2024-06-04 LAB
CRP SERPL-MCNC: 0.29 MG/DL
ERYTHROCYTE [DISTWIDTH] IN BLOOD BY AUTOMATED COUNT: 12.5 % (ref 11.5–14.5)
ERYTHROCYTE [SEDIMENTATION RATE] IN BLOOD BY WESTERGREN METHOD: 10 MM/H (ref 0–30)
HCT VFR BLD AUTO: 50.1 % (ref 36–46)
HGB BLD-MCNC: 16.7 G/DL (ref 12–16)
MCH RBC QN AUTO: 30.7 PG (ref 26–34)
MCHC RBC AUTO-ENTMCNC: 33.3 G/DL (ref 32–36)
MCV RBC AUTO: 92 FL (ref 80–100)
NRBC BLD-RTO: 0 /100 WBCS (ref 0–0)
PLATELET # BLD AUTO: 257 X10*3/UL (ref 150–450)
RBC # BLD AUTO: 5.44 X10*6/UL (ref 4–5.2)
WBC # BLD AUTO: 6.7 X10*3/UL (ref 4.4–11.3)

## 2024-06-04 PROCEDURE — 85652 RBC SED RATE AUTOMATED: CPT

## 2024-06-04 PROCEDURE — 74018 RADEX ABDOMEN 1 VIEW: CPT

## 2024-06-04 PROCEDURE — 36415 COLL VENOUS BLD VENIPUNCTURE: CPT

## 2024-06-04 PROCEDURE — 3078F DIAST BP <80 MM HG: CPT | Performed by: NURSE PRACTITIONER

## 2024-06-04 PROCEDURE — 99214 OFFICE O/P EST MOD 30 MIN: CPT | Performed by: NURSE PRACTITIONER

## 2024-06-04 PROCEDURE — 74018 RADEX ABDOMEN 1 VIEW: CPT | Performed by: RADIOLOGY

## 2024-06-04 PROCEDURE — 3077F SYST BP >= 140 MM HG: CPT | Performed by: NURSE PRACTITIONER

## 2024-06-04 PROCEDURE — 85027 COMPLETE CBC AUTOMATED: CPT

## 2024-06-04 PROCEDURE — 86140 C-REACTIVE PROTEIN: CPT

## 2024-06-04 NOTE — PROGRESS NOTES
"Subjective   Patient ID: Rubi Leal is a 53 y.o. female who presents for Abdominal Pain (States that she has been having episodes of abd pain, vomiting for years. Within the first hour that she is awake, she will start having abd pain that is accompanied with either constipation/diarrhea that is followed by vomiting. Zofran doesn't help. The pain is localized in the middle of abd, describes as stabbing pain. Last EGD/colon in 2021.).  [facility]  LOV 12/28/2021 by Dr. Álvarez:  Rubi Leal is a 50yo female with a PMH of HTN, HLD, vitamin D deficiency, and seasonal allergies who presents to clinic for multiple GI complaints including abdominal pain/discomfort, N/V, and diarrhea. 3-4 severe episodes/yr. Etiology of symptoms are unclear. Organic cause has essentially been ruled out. She has had a significant GI work-up including EGD, colonoscopy, CT imaging, and blood work. All tests have essentially been normal. Symptoms do not seem to be biliary in nature, however, will obtain abdominal ultrasound. Stress seems to be playing a significant role. Discussed the importance of stress reduction (daily walks, \"me\" time). Patient also going to keep a food journal, avoid dairy, and start with a low FODMAP diet. Of note, reviewed CT and there does not seem to be a large stool burden. Will consider TCA if no improvement in symptoms.    Endoscopic investigations:  She underwent EGD and colonoscopy in 5/2021. She was noted to have gastritis, diverticulosis, and 2 polyps. However, pathology did not show gastritis. No celiac disease. 1 polyp was adenomatous and 1 was hyperplastic. No microscopic colitis.    Patient presents to the GI clinic with a constellation of chronic GI complaints.  She reports experiencing flareups or episodes since her 20s.  These episodes happen 1-2 times a year typically with increased stress or eating chocolate.  She endorses history of lactose intolerance, does not avoid it just limits it.  During " these flareups she will experience upper abdominal pain, nausea and vomiting, diarrhea or constipation.  She will also experience diaphoresis before a bowel movement.  She endorses typically her bowel movements are varied.  She denies straining, but endorses abdominal bloating.  She is not sure if she feels fully evacuated.  She denies hematochezia or hematemesis but has experienced dark stool during her flareups.  Uses Zofran as needed with suboptimal response.  She is a daily smoker with occasional alcohol use.  No NSAIDs.  No family history of CRC or IBD.  GERD, takes PPI with good response.  No dysphagia.  Weight and appetite are stable.  Patient did not obtain abdominal ultrasound as requested at her last visit.  She did not keep a diary or try a low FODMAP diet.  Review of Systems   All other systems reviewed and are negative.      Objective   Physical Exam  Vitals reviewed.   Constitutional:       General: She is awake. She is not in acute distress.     Appearance: Normal appearance. She is not ill-appearing, toxic-appearing or diaphoretic.   HENT:      Head: Normocephalic and atraumatic.   Eyes:      General: No scleral icterus.     Pupils: Pupils are equal, round, and reactive to light.   Cardiovascular:      Rate and Rhythm: Normal rate and regular rhythm.      Heart sounds: Normal heart sounds. No murmur heard.  Pulmonary:      Effort: Pulmonary effort is normal. No respiratory distress.      Breath sounds: Normal breath sounds.   Abdominal:      General: Abdomen is protuberant. Bowel sounds are normal.      Palpations: Abdomen is soft. There is no hepatomegaly.      Tenderness: There is no abdominal tenderness.   Musculoskeletal:         General: Normal range of motion.      Cervical back: Normal range of motion.      Right lower leg: No edema.      Left lower leg: No edema.   Skin:     General: Skin is warm and dry.      Coloration: Skin is not jaundiced or pale.      Comments: Spider angiomata noted on  face and neck   Neurological:      General: No focal deficit present.      Mental Status: She is alert and oriented to person, place, and time.      Motor: No weakness.      Gait: Gait normal.   Psychiatric:         Mood and Affect: Mood normal.         Behavior: Behavior is cooperative.         Thought Content: Thought content normal.         Judgment: Judgment normal.         Assessment/Plan   Diagnoses and all orders for this visit:  53-year-old female with a constellation of chronic GI complaints.  She was seen in 2021 and did not follow through with recommendations.  Upper and lower endoscopic investigations in 2021, detailed above.  No red flag symptoms noted, clinical history suggestive of IBS-mixed.  At this time we will proceed with routine labs along with a fecal calprotectin.  Will also have the patient obtain an abdominal x-ray to rule out stool burden.  Abdominal ultrasound to rule out cholelithiasis.  Overall her symptoms seem to be precipitated by stress, recommend antistress/relaxation techniques.  -Smoking and alcohol cessation.         JESSE Centeno-CNP 06/04/24 10:19 AM

## 2024-06-06 ENCOUNTER — HOSPITAL ENCOUNTER (OUTPATIENT)
Dept: RADIOLOGY | Facility: CLINIC | Age: 54
Discharge: HOME | End: 2024-06-06
Payer: COMMERCIAL

## 2024-06-06 DIAGNOSIS — R11.2 NAUSEA AND VOMITING IN ADULT: ICD-10-CM

## 2024-06-06 DIAGNOSIS — R10.10 UPPER ABDOMINAL PAIN: ICD-10-CM

## 2024-06-06 PROCEDURE — 76700 US EXAM ABDOM COMPLETE: CPT | Performed by: RADIOLOGY

## 2024-06-06 PROCEDURE — 76700 US EXAM ABDOM COMPLETE: CPT

## 2024-06-20 DIAGNOSIS — I10 ESSENTIAL (PRIMARY) HYPERTENSION: ICD-10-CM

## 2024-06-20 RX ORDER — LISINOPRIL 20 MG/1
TABLET ORAL
Qty: 90 TABLET | Refills: 1 | Status: SHIPPED | OUTPATIENT
Start: 2024-06-20

## 2024-06-20 RX ORDER — AMLODIPINE BESYLATE 5 MG/1
TABLET ORAL
Qty: 90 TABLET | Refills: 1 | Status: SHIPPED | OUTPATIENT
Start: 2024-06-20

## 2024-06-27 ENCOUNTER — APPOINTMENT (OUTPATIENT)
Dept: GASTROENTEROLOGY | Facility: CLINIC | Age: 54
End: 2024-06-27
Payer: COMMERCIAL

## 2024-07-02 ENCOUNTER — APPOINTMENT (OUTPATIENT)
Dept: GASTROENTEROLOGY | Facility: CLINIC | Age: 54
End: 2024-07-02
Payer: COMMERCIAL

## 2024-08-30 DIAGNOSIS — Z87.19 PERSONAL HISTORY OF OTHER DISEASES OF THE DIGESTIVE SYSTEM: ICD-10-CM

## 2024-08-30 DIAGNOSIS — E78.5 HYPERLIPIDEMIA, UNSPECIFIED: ICD-10-CM

## 2024-08-30 NOTE — TELEPHONE ENCOUNTER
Pharmacy requests prescription below    Last Office Visit: Visit date not found   Next Office Visit: Visit date not found     Requested Prescriptions     Pending Prescriptions Disp Refills    atorvastatin (Lipitor) 10 mg tablet [Pharmacy Med Name: ATORVASTATIN 10 MG TABLET] 90 tablet 1     Sig: TAKE 1 TABLET BY MOUTH EVERY DAY    pantoprazole (ProtoNix) 40 mg EC tablet [Pharmacy Med Name: PANTOPRAZOLE SOD DR 40 MG TAB] 90 tablet 1     Sig: TAKE 1 TABLET BY MOUTH EVERY DAY

## 2024-09-03 RX ORDER — ATORVASTATIN CALCIUM 10 MG/1
10 TABLET, FILM COATED ORAL DAILY
Qty: 90 TABLET | Refills: 1 | Status: SHIPPED | OUTPATIENT
Start: 2024-09-03

## 2024-09-03 RX ORDER — PANTOPRAZOLE SODIUM 40 MG/1
TABLET, DELAYED RELEASE ORAL
Qty: 90 TABLET | Refills: 1 | Status: SHIPPED | OUTPATIENT
Start: 2024-09-03

## 2024-09-27 ENCOUNTER — HOSPITAL ENCOUNTER (OUTPATIENT)
Dept: RADIOLOGY | Facility: CLINIC | Age: 54
Discharge: HOME | End: 2024-09-27
Payer: COMMERCIAL

## 2024-09-27 ENCOUNTER — OFFICE VISIT (OUTPATIENT)
Dept: PRIMARY CARE | Facility: CLINIC | Age: 54
End: 2024-09-27
Payer: COMMERCIAL

## 2024-09-27 VITALS
RESPIRATION RATE: 28 BRPM | HEART RATE: 59 BPM | DIASTOLIC BLOOD PRESSURE: 84 MMHG | SYSTOLIC BLOOD PRESSURE: 122 MMHG | OXYGEN SATURATION: 99 % | WEIGHT: 176 LBS | TEMPERATURE: 98 F | BODY MASS INDEX: 29.29 KG/M2

## 2024-09-27 DIAGNOSIS — R05.1 ACUTE COUGH: ICD-10-CM

## 2024-09-27 DIAGNOSIS — R05.1 ACUTE COUGH: Primary | ICD-10-CM

## 2024-09-27 DIAGNOSIS — J32.9 SINOBRONCHITIS: ICD-10-CM

## 2024-09-27 DIAGNOSIS — J40 SINOBRONCHITIS: ICD-10-CM

## 2024-09-27 LAB — POC SARS-COV-2 AG BINAX: NORMAL

## 2024-09-27 PROCEDURE — 87635 SARS-COV-2 COVID-19 AMP PRB: CPT

## 2024-09-27 PROCEDURE — 99213 OFFICE O/P EST LOW 20 MIN: CPT | Performed by: NURSE PRACTITIONER

## 2024-09-27 PROCEDURE — 87811 SARS-COV-2 COVID19 W/OPTIC: CPT | Performed by: NURSE PRACTITIONER

## 2024-09-27 PROCEDURE — 3074F SYST BP LT 130 MM HG: CPT | Performed by: NURSE PRACTITIONER

## 2024-09-27 PROCEDURE — 71046 X-RAY EXAM CHEST 2 VIEWS: CPT

## 2024-09-27 PROCEDURE — 3079F DIAST BP 80-89 MM HG: CPT | Performed by: NURSE PRACTITIONER

## 2024-09-27 RX ORDER — METHYLPREDNISOLONE 4 MG/1
TABLET ORAL
Qty: 21 TABLET | Refills: 0 | Status: SHIPPED | OUTPATIENT
Start: 2024-09-27 | End: 2024-10-04

## 2024-09-27 RX ORDER — DOXYCYCLINE 100 MG/1
100 CAPSULE ORAL 2 TIMES DAILY
Qty: 14 CAPSULE | Refills: 0 | Status: SHIPPED | OUTPATIENT
Start: 2024-09-27 | End: 2024-10-04

## 2024-09-27 RX ORDER — ALBUTEROL SULFATE 90 UG/1
2 INHALANT RESPIRATORY (INHALATION) EVERY 4 HOURS PRN
Qty: 6.7 G | Refills: 0 | Status: SHIPPED | OUTPATIENT
Start: 2024-09-27 | End: 2024-10-27

## 2024-09-27 ASSESSMENT — ENCOUNTER SYMPTOMS
SLEEP DISTURBANCE: 1
VOMITING: 0
CONSTIPATION: 0
HEADACHES: 1
CHILLS: 0
SHORTNESS OF BREATH: 1
NAUSEA: 0
DIARRHEA: 0
APPETITE CHANGE: 0
SORE THROAT: 1
ACTIVITY CHANGE: 0
FEVER: 0
COUGH: 1

## 2024-09-27 NOTE — PROGRESS NOTES
"Subjective   Rubi Leal is a 54 y.o. female who presents for Shortness of Breath.    PT concerned for possible black mold in her home.      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   Cleaning up a lot mice droppings  Mild Nasal congestion  Scratchy throat  Post nasal drip  Headaches at times  Interrupted sleep    Used inhaler on the way here; Feeling better since she left work    Denies any hx of asthma/COPD    OTC-using inhaler     Shortness of Breath  Episode onset: 1 week. The problem has been gradually worsening. Associated symptoms include headaches and a sore throat. Pertinent negatives include no ear pain, fever or vomiting.     Review of Systems   Constitutional:  Negative for activity change, appetite change, chills and fever.   HENT:  Positive for congestion, sneezing and sore throat. Negative for ear pain.    Respiratory:  Positive for cough and shortness of breath.    Gastrointestinal:  Negative for constipation, diarrhea, nausea and vomiting.   Neurological:  Positive for headaches.   Psychiatric/Behavioral:  Positive for sleep disturbance.      Objective   Physical Exam  Vitals reviewed.   Constitutional:       Appearance: Normal appearance.   HENT:      Head: Normocephalic.   Eyes:      Extraocular Movements: Extraocular movements intact.      Conjunctiva/sclera: Conjunctivae normal.      Pupils: Pupils are equal, round, and reactive to light.   Cardiovascular:      Rate and Rhythm: Normal rate and regular rhythm.      Pulses: Normal pulses.      Heart sounds: Normal heart sounds.   Pulmonary:      Effort: Pulmonary effort is normal.      Breath sounds: Normal breath sounds.   Abdominal:      Tenderness: There is no right CVA tenderness, left CVA tenderness, guarding or rebound.   Musculoskeletal:      Cervical back: " Normal, normal range of motion and neck supple.      Thoracic back: Normal.      Lumbar back: Spasms and tenderness present. No swelling or deformity. Normal range of motion.   Skin:     General: Skin is warm.      Capillary Refill: Capillary refill takes less than 2 seconds.   Neurological:      General: No focal deficit present.      Mental Status: She is alert and oriented to person, place, and time.   Psychiatric:         Mood and Affect: Mood normal.         Behavior: Behavior normal.       /84   Pulse 59   Temp 36.7 °C (98 °F)   Resp (!) 28   Wt 79.8 kg (176 lb)   SpO2 99%   BMI 29.29 kg/m²   Assessment/Plan   Problem List Items Addressed This Visit       Sinobronchitis    Relevant Medications    methylPREDNISolone (Medrol Dospak) 4 mg tablets    albuterol (Proventil HFA) 90 mcg/actuation inhaler    doxycycline (Vibramycin) 100 mg capsule     Other Visit Diagnoses       Acute cough    -  Primary    Relevant Orders    XR chest 2 views (Completed)    POCT BinaxNOW Covid-19 Ag Card manually resulted (Completed)    Sars-CoV-2 PCR (Completed)          Order for chest xray placed  IO Covid negative; PCR to be sent. Will follow up results as needed  Reviewed supportive care for cold/Covid symptoms  Can use Flonase and Zyrtec daily for symptom support  If all testing is negative, can start antibiotic that was sent in. Take full course until completed  F/U with PCP if not improving over the next 3-5 days  ER for SOB, difficulty breathing, uncontrolled fever

## 2024-09-28 LAB — SARS-COV-2 RNA RESP QL NAA+PROBE: NOT DETECTED

## 2024-10-17 ENCOUNTER — HOSPITAL ENCOUNTER (EMERGENCY)
Facility: HOSPITAL | Age: 54
Discharge: HOME | End: 2024-10-17
Attending: EMERGENCY MEDICINE
Payer: COMMERCIAL

## 2024-10-17 ENCOUNTER — APPOINTMENT (OUTPATIENT)
Dept: CARDIOLOGY | Facility: HOSPITAL | Age: 54
End: 2024-10-17
Payer: COMMERCIAL

## 2024-10-17 ENCOUNTER — APPOINTMENT (OUTPATIENT)
Dept: RADIOLOGY | Facility: HOSPITAL | Age: 54
End: 2024-10-17
Payer: COMMERCIAL

## 2024-10-17 VITALS
TEMPERATURE: 97.2 F | HEIGHT: 65 IN | RESPIRATION RATE: 16 BRPM | HEART RATE: 74 BPM | OXYGEN SATURATION: 98 % | BODY MASS INDEX: 29.16 KG/M2 | SYSTOLIC BLOOD PRESSURE: 179 MMHG | DIASTOLIC BLOOD PRESSURE: 90 MMHG | WEIGHT: 175 LBS

## 2024-10-17 DIAGNOSIS — R11.2 NAUSEA AND VOMITING, UNSPECIFIED VOMITING TYPE: ICD-10-CM

## 2024-10-17 DIAGNOSIS — R11.15 CYCLIC VOMITING SYNDROME: Primary | ICD-10-CM

## 2024-10-17 LAB
ALBUMIN SERPL BCP-MCNC: 5.3 G/DL (ref 3.4–5)
ALP SERPL-CCNC: 106 U/L (ref 33–110)
ALT SERPL W P-5'-P-CCNC: 22 U/L (ref 7–45)
AMPHETAMINES UR QL SCN: ABNORMAL
ANION GAP SERPL CALC-SCNC: 19 MMOL/L (ref 10–20)
APPEARANCE UR: CLEAR
AST SERPL W P-5'-P-CCNC: 19 U/L (ref 9–39)
BARBITURATES UR QL SCN: ABNORMAL
BASOPHILS # BLD AUTO: 0.03 X10*3/UL (ref 0–0.1)
BASOPHILS NFR BLD AUTO: 0.2 %
BENZODIAZ UR QL SCN: ABNORMAL
BILIRUB SERPL-MCNC: 0.8 MG/DL (ref 0–1.2)
BILIRUB UR STRIP.AUTO-MCNC: NEGATIVE MG/DL
BUN SERPL-MCNC: 8 MG/DL (ref 6–23)
BZE UR QL SCN: ABNORMAL
CALCIUM SERPL-MCNC: 9.6 MG/DL (ref 8.6–10.3)
CANNABINOIDS UR QL SCN: ABNORMAL
CHLORIDE SERPL-SCNC: 101 MMOL/L (ref 98–107)
CO2 SERPL-SCNC: 21 MMOL/L (ref 21–32)
COLOR UR: COLORLESS
CREAT SERPL-MCNC: 0.67 MG/DL (ref 0.5–1.05)
EGFRCR SERPLBLD CKD-EPI 2021: >90 ML/MIN/1.73M*2
EOSINOPHIL # BLD AUTO: 0 X10*3/UL (ref 0–0.7)
EOSINOPHIL NFR BLD AUTO: 0 %
ERYTHROCYTE [DISTWIDTH] IN BLOOD BY AUTOMATED COUNT: 12.2 % (ref 11.5–14.5)
FENTANYL+NORFENTANYL UR QL SCN: ABNORMAL
GLUCOSE SERPL-MCNC: 224 MG/DL (ref 74–99)
GLUCOSE UR STRIP.AUTO-MCNC: ABNORMAL MG/DL
HCT VFR BLD AUTO: 53.3 % (ref 36–46)
HGB BLD-MCNC: 18.1 G/DL (ref 12–16)
IMM GRANULOCYTES # BLD AUTO: 0.04 X10*3/UL (ref 0–0.7)
IMM GRANULOCYTES NFR BLD AUTO: 0.3 % (ref 0–0.9)
KETONES UR STRIP.AUTO-MCNC: ABNORMAL MG/DL
LEUKOCYTE ESTERASE UR QL STRIP.AUTO: NEGATIVE
LIPASE SERPL-CCNC: 8 U/L (ref 9–82)
LYMPHOCYTES # BLD AUTO: 0.89 X10*3/UL (ref 1.2–4.8)
LYMPHOCYTES NFR BLD AUTO: 6.8 %
MCH RBC QN AUTO: 30.5 PG (ref 26–34)
MCHC RBC AUTO-ENTMCNC: 34 G/DL (ref 32–36)
MCV RBC AUTO: 90 FL (ref 80–100)
METHADONE UR QL SCN: ABNORMAL
MONOCYTES # BLD AUTO: 0.41 X10*3/UL (ref 0.1–1)
MONOCYTES NFR BLD AUTO: 3.1 %
NEUTROPHILS # BLD AUTO: 11.74 X10*3/UL (ref 1.2–7.7)
NEUTROPHILS NFR BLD AUTO: 89.6 %
NITRITE UR QL STRIP.AUTO: NEGATIVE
NRBC BLD-RTO: 0 /100 WBCS (ref 0–0)
OPIATES UR QL SCN: ABNORMAL
OXYCODONE+OXYMORPHONE UR QL SCN: ABNORMAL
PCP UR QL SCN: ABNORMAL
PH UR STRIP.AUTO: 7 [PH]
PLATELET # BLD AUTO: 298 X10*3/UL (ref 150–450)
POTASSIUM SERPL-SCNC: 4.2 MMOL/L (ref 3.5–5.3)
PROT SERPL-MCNC: 8 G/DL (ref 6.4–8.2)
PROT UR STRIP.AUTO-MCNC: ABNORMAL MG/DL
RBC # BLD AUTO: 5.94 X10*6/UL (ref 4–5.2)
RBC # UR STRIP.AUTO: ABNORMAL /UL
RBC #/AREA URNS AUTO: NORMAL /HPF
SODIUM SERPL-SCNC: 137 MMOL/L (ref 136–145)
SP GR UR STRIP.AUTO: 1.01
SQUAMOUS #/AREA URNS AUTO: NORMAL /HPF
UROBILINOGEN UR STRIP.AUTO-MCNC: NORMAL MG/DL
WBC # BLD AUTO: 13.1 X10*3/UL (ref 4.4–11.3)
WBC #/AREA URNS AUTO: NORMAL /HPF

## 2024-10-17 PROCEDURE — 93005 ELECTROCARDIOGRAM TRACING: CPT

## 2024-10-17 PROCEDURE — 96374 THER/PROPH/DIAG INJ IV PUSH: CPT

## 2024-10-17 PROCEDURE — 80307 DRUG TEST PRSMV CHEM ANLYZR: CPT

## 2024-10-17 PROCEDURE — 2500000004 HC RX 250 GENERAL PHARMACY W/ HCPCS (ALT 636 FOR OP/ED): Performed by: EMERGENCY MEDICINE

## 2024-10-17 PROCEDURE — 71045 X-RAY EXAM CHEST 1 VIEW: CPT

## 2024-10-17 PROCEDURE — 85025 COMPLETE CBC W/AUTO DIFF WBC: CPT

## 2024-10-17 PROCEDURE — 2500000001 HC RX 250 WO HCPCS SELF ADMINISTERED DRUGS (ALT 637 FOR MEDICARE OP)

## 2024-10-17 PROCEDURE — 96361 HYDRATE IV INFUSION ADD-ON: CPT

## 2024-10-17 PROCEDURE — 96375 TX/PRO/DX INJ NEW DRUG ADDON: CPT

## 2024-10-17 PROCEDURE — 71045 X-RAY EXAM CHEST 1 VIEW: CPT | Performed by: RADIOLOGY

## 2024-10-17 PROCEDURE — 99284 EMERGENCY DEPT VISIT MOD MDM: CPT | Mod: 25

## 2024-10-17 PROCEDURE — 81001 URINALYSIS AUTO W/SCOPE: CPT

## 2024-10-17 PROCEDURE — 2500000004 HC RX 250 GENERAL PHARMACY W/ HCPCS (ALT 636 FOR OP/ED)

## 2024-10-17 PROCEDURE — 36415 COLL VENOUS BLD VENIPUNCTURE: CPT

## 2024-10-17 PROCEDURE — 80053 COMPREHEN METABOLIC PANEL: CPT

## 2024-10-17 PROCEDURE — 83690 ASSAY OF LIPASE: CPT

## 2024-10-17 PROCEDURE — 99284 EMERGENCY DEPT VISIT MOD MDM: CPT | Performed by: EMERGENCY MEDICINE

## 2024-10-17 PROCEDURE — 96372 THER/PROPH/DIAG INJ SC/IM: CPT

## 2024-10-17 RX ORDER — KETOROLAC TROMETHAMINE 30 MG/ML
30 INJECTION, SOLUTION INTRAMUSCULAR; INTRAVENOUS ONCE
Status: COMPLETED | OUTPATIENT
Start: 2024-10-17 | End: 2024-10-17

## 2024-10-17 RX ORDER — ONDANSETRON 4 MG/1
4 TABLET, ORALLY DISINTEGRATING ORAL EVERY 8 HOURS PRN
Qty: 15 TABLET | Refills: 0 | Status: SHIPPED | OUTPATIENT
Start: 2024-10-17 | End: 2024-10-22 | Stop reason: SDUPTHER

## 2024-10-17 RX ORDER — DROPERIDOL 2.5 MG/ML
1.25 INJECTION, SOLUTION INTRAMUSCULAR; INTRAVENOUS ONCE
Status: COMPLETED | OUTPATIENT
Start: 2024-10-17 | End: 2024-10-17

## 2024-10-17 RX ORDER — AMLODIPINE BESYLATE 5 MG/1
5 TABLET ORAL ONCE
Status: COMPLETED | OUTPATIENT
Start: 2024-10-17 | End: 2024-10-17

## 2024-10-17 RX ORDER — ONDANSETRON HYDROCHLORIDE 2 MG/ML
4 INJECTION, SOLUTION INTRAVENOUS ONCE
Status: COMPLETED | OUTPATIENT
Start: 2024-10-17 | End: 2024-10-17

## 2024-10-17 RX ADMIN — DROPERIDOL 1.25 MG: 2.5 INJECTION, SOLUTION INTRAMUSCULAR; INTRAVENOUS at 20:37

## 2024-10-17 RX ADMIN — AMLODIPINE BESYLATE 5 MG: 5 TABLET ORAL at 21:50

## 2024-10-17 RX ADMIN — SODIUM CHLORIDE, POTASSIUM CHLORIDE, SODIUM LACTATE AND CALCIUM CHLORIDE 1000 ML: 600; 310; 30; 20 INJECTION, SOLUTION INTRAVENOUS at 18:27

## 2024-10-17 RX ADMIN — ONDANSETRON 4 MG: 2 INJECTION INTRAMUSCULAR; INTRAVENOUS at 18:27

## 2024-10-17 RX ADMIN — KETOROLAC TROMETHAMINE 30 MG: 30 INJECTION, SOLUTION INTRAMUSCULAR at 18:57

## 2024-10-17 ASSESSMENT — LIFESTYLE VARIABLES
EVER FELT BAD OR GUILTY ABOUT YOUR DRINKING: NO
EVER HAD A DRINK FIRST THING IN THE MORNING TO STEADY YOUR NERVES TO GET RID OF A HANGOVER: NO
TOTAL SCORE: 0
HAVE YOU EVER FELT YOU SHOULD CUT DOWN ON YOUR DRINKING: NO
HAVE PEOPLE ANNOYED YOU BY CRITICIZING YOUR DRINKING: NO

## 2024-10-17 ASSESSMENT — COLUMBIA-SUICIDE SEVERITY RATING SCALE - C-SSRS
6. HAVE YOU EVER DONE ANYTHING, STARTED TO DO ANYTHING, OR PREPARED TO DO ANYTHING TO END YOUR LIFE?: NO
2. HAVE YOU ACTUALLY HAD ANY THOUGHTS OF KILLING YOURSELF?: NO
1. IN THE PAST MONTH, HAVE YOU WISHED YOU WERE DEAD OR WISHED YOU COULD GO TO SLEEP AND NOT WAKE UP?: NO

## 2024-10-17 ASSESSMENT — PAIN SCALES - GENERAL
PAINLEVEL_OUTOF10: 7
PAINLEVEL_OUTOF10: 1
PAINLEVEL_OUTOF10: 1
PAINLEVEL_OUTOF10: 6

## 2024-10-17 ASSESSMENT — PAIN - FUNCTIONAL ASSESSMENT
PAIN_FUNCTIONAL_ASSESSMENT: 0-10
PAIN_FUNCTIONAL_ASSESSMENT: 0-10

## 2024-10-17 ASSESSMENT — PAIN DESCRIPTION - LOCATION
LOCATION: BACK
LOCATION: ABDOMEN

## 2024-10-17 ASSESSMENT — PAIN DESCRIPTION - PAIN TYPE
TYPE: ACUTE PAIN
TYPE: CHRONIC PAIN

## 2024-10-17 ASSESSMENT — PAIN DESCRIPTION - ORIENTATION: ORIENTATION: LOWER;MID

## 2024-10-17 ASSESSMENT — PAIN DESCRIPTION - DESCRIPTORS: DESCRIPTORS: ACHING

## 2024-10-17 NOTE — ED PROVIDER NOTES
HPI   Chief Complaint   Patient presents with    Abdominal Pain     Pt presents to the ED with diarrhea, vomiting, nausea, and mid lower abdominal pain that started at noon today. Pt does endorse using marijuana two days ago.        This is a 54 years old female patient presented to the emergency department with a chief complaint of nausea, vomiting, and abdominal pain.  Stated that she had multiple episodes of this in the past and it is secondary to cyclic vomiting syndrome.  Last time she smoked marijuana was 2 days ago.  Denies any chest pain, shortness of breath, stated that her abdominal pain is not out of ordinary and it is the same pain that she gets when she gets her symptoms.  Denies any dizziness, lightheadedness, urinary symptoms, fever, chills, body aches, weakness or focal numbness.    Review of system: As above in the HPI section.            Patient History   Past Medical History:   Diagnosis Date    Calculus of kidney     Multiple kidney stones    Encounter for follow-up examination after completed treatment for conditions other than malignant neoplasm 11/17/2021    Hospital discharge follow-up    Generalized abdominal pain 12/28/2021    Generalized abdominal pain    Menopausal and female climacteric states 12/17/2019    Perimenopausal    Osteophyte, vertebrae     Osteophyte of spine    Other muscle spasm 02/04/2022    Trapezius muscle spasm    Personal history of other diseases of the musculoskeletal system and connective tissue     History of degenerative disc disease    Personal history of other diseases of the nervous system and sense organs     History of tension headache    Personal history of other specified conditions     History of abdominal pain    Serous retinal detachment, bilateral     Bilateral retinal detachment    Strain of muscle, fascia and tendon at neck level, initial encounter 03/09/2022    Strain of sternocleidomastoid muscle, initial encounter    Strain of other muscles, fascia  and tendons at shoulder and upper arm level, left arm, initial encounter 03/09/2022    Strain of left trapezius muscle, initial encounter     Past Surgical History:   Procedure Laterality Date    OTHER SURGICAL HISTORY  09/04/2019    Tonsillectomy with adenoidectomy    OTHER SURGICAL HISTORY  09/04/2019    Lithotripsy    OTHER SURGICAL HISTORY  09/04/2019    Carpal tunnel surgery    OTHER SURGICAL HISTORY  02/03/2021    Retinal detachment repair     No family history on file.  Social History     Tobacco Use    Smoking status: Every Day     Current packs/day: 1.00     Types: Cigarettes     Passive exposure: Current    Smokeless tobacco: Never   Vaping Use    Vaping status: Never Used   Substance Use Topics    Alcohol use: Not on file    Drug use: Not on file       Physical Exam   ED Triage Vitals [10/17/24 1759]   Temperature Heart Rate Respirations BP   36.2 °C (97.2 °F) 88 18 (!) 171/98      Pulse Ox Temp Source Heart Rate Source Patient Position   98 % Temporal Monitor Sitting      BP Location FiO2 (%)     Right arm --       Physical Exam  Vitals and nursing note reviewed.   Constitutional:       General: She is not in acute distress.     Appearance: She is well-developed.   HENT:      Head: Normocephalic and atraumatic.   Eyes:      Conjunctiva/sclera: Conjunctivae normal.   Cardiovascular:      Rate and Rhythm: Normal rate and regular rhythm.      Heart sounds: No murmur heard.  Pulmonary:      Effort: Pulmonary effort is normal. No respiratory distress.      Breath sounds: Normal breath sounds.   Abdominal:      Palpations: Abdomen is soft.      Tenderness: There is generalized abdominal tenderness.      Comments: Abdomen is soft, nondistended, nontender, no guarding, rigidity, rebound.   Musculoskeletal:         General: No swelling.      Cervical back: Neck supple.   Skin:     General: Skin is warm and dry.      Capillary Refill: Capillary refill takes less than 2 seconds.   Neurological:      Mental Status:  She is alert.   Psychiatric:         Mood and Affect: Mood normal.       ED Course & MDM   Diagnoses as of 10/19/24 2225   Cyclic vomiting syndrome                 No data recorded     Olvin Coma Scale Score: 15 (10/17/24 1802 : Rosaura Hollins RN)                           Medical Decision Making  Patient seen and examined, administer Zofran and Toradol for symptoms control, administer IV fluids, stated that Zofran always help for her nausea and vomiting.  Stated that her abdominal pain is not out of ordinary and it is the same exact abdominal pain that she gets when she goes into cyclic vomiting syndrome.  Will reassess the patient after symptomatic treatment to determine disposition.    I saw and evaluated the patient. I personally obtained the key and critical portions of the history and physical exam or was physically present for key and critical portions performed by the resident/fellow. I reviewed the resident/fellow's documentation and discussed the patient with the resident/fellow. I agree with the resident/fellow's medical decision making as documented in the note.  Patient reported improvement in symptoms, workup is unremarkable, discharged on Zofran to follow-up with a primary care doctor with instruction to return to the ED if alarming symptoms arise.  Leukocytosis is likely reactive secondary to the vomiting.  Allen Brooks,          Procedure  Procedures     Allen Brooks,   10/19/24 2227

## 2024-10-18 LAB
ATRIAL RATE: 73 BPM
ATRIAL RATE: 92 BPM
HOLD SPECIMEN: NORMAL
P AXIS: 49 DEGREES
P AXIS: 68 DEGREES
P OFFSET: 202 MS
P OFFSET: 203 MS
P ONSET: 154 MS
P ONSET: 157 MS
PR INTERVAL: 128 MS
PR INTERVAL: 132 MS
Q ONSET: 220 MS
Q ONSET: 221 MS
QRS COUNT: 12 BEATS
QRS COUNT: 15 BEATS
QRS DURATION: 80 MS
QRS DURATION: 88 MS
QT INTERVAL: 226 MS
QT INTERVAL: 380 MS
QTC CALCULATION(BAZETT): 279 MS
QTC CALCULATION(BAZETT): 418 MS
QTC FREDERICIA: 260 MS
QTC FREDERICIA: 405 MS
R AXIS: 43 DEGREES
R AXIS: 52 DEGREES
T AXIS: 249 DEGREES
T AXIS: 29 DEGREES
T OFFSET: 333 MS
T OFFSET: 411 MS
VENTRICULAR RATE: 73 BPM
VENTRICULAR RATE: 92 BPM

## 2024-10-18 NOTE — DISCHARGE INSTRUCTIONS
Please return to close ED if develop any worsening symptoms including excessive chest pain, shortness of breath, persistent nausea/vomiting, blood in the vomit or stool, weakness, or dizziness.

## 2024-10-22 ENCOUNTER — OFFICE VISIT (OUTPATIENT)
Dept: PRIMARY CARE | Facility: CLINIC | Age: 54
End: 2024-10-22
Payer: COMMERCIAL

## 2024-10-22 VITALS
SYSTOLIC BLOOD PRESSURE: 128 MMHG | WEIGHT: 170 LBS | HEIGHT: 65 IN | DIASTOLIC BLOOD PRESSURE: 68 MMHG | HEART RATE: 82 BPM | OXYGEN SATURATION: 97 % | BODY MASS INDEX: 28.32 KG/M2 | RESPIRATION RATE: 18 BRPM

## 2024-10-22 DIAGNOSIS — E55.9 VITAMIN D DEFICIENCY: ICD-10-CM

## 2024-10-22 DIAGNOSIS — R73.9 BLOOD GLUCOSE ELEVATED: ICD-10-CM

## 2024-10-22 DIAGNOSIS — E78.5 HYPERLIPIDEMIA, UNSPECIFIED HYPERLIPIDEMIA TYPE: ICD-10-CM

## 2024-10-22 DIAGNOSIS — R07.9 CHEST PAIN, UNSPECIFIED TYPE: ICD-10-CM

## 2024-10-22 DIAGNOSIS — R11.15 CYCLIC VOMITING SYNDROME: Primary | ICD-10-CM

## 2024-10-22 DIAGNOSIS — F41.1 GENERALIZED ANXIETY DISORDER: ICD-10-CM

## 2024-10-22 DIAGNOSIS — I10 PRIMARY HYPERTENSION: ICD-10-CM

## 2024-10-22 DIAGNOSIS — D75.1 POLYCYTHEMIA: ICD-10-CM

## 2024-10-22 DIAGNOSIS — R73.9 ELEVATED BLOOD SUGAR: ICD-10-CM

## 2024-10-22 DIAGNOSIS — Z13.29 SCREENING FOR THYROID DISORDER: ICD-10-CM

## 2024-10-22 DIAGNOSIS — R11.2 NAUSEA AND VOMITING, UNSPECIFIED VOMITING TYPE: ICD-10-CM

## 2024-10-22 PROBLEM — K21.9 GERD (GASTROESOPHAGEAL REFLUX DISEASE): Status: ACTIVE | Noted: 2024-10-22

## 2024-10-22 PROBLEM — L98.9 SKIN LESION OF FACE: Status: RESOLVED | Noted: 2024-10-22 | Resolved: 2024-10-22

## 2024-10-22 PROBLEM — N13.30 HYDRONEPHROSIS: Status: RESOLVED | Noted: 2024-10-22 | Resolved: 2024-10-22

## 2024-10-22 PROBLEM — G56.00 CARPAL TUNNEL SYNDROME: Status: RESOLVED | Noted: 2024-10-22 | Resolved: 2024-10-22

## 2024-10-22 PROBLEM — M47.9 SPINAL OSTEOARTHRITIS: Status: ACTIVE | Noted: 2024-10-22

## 2024-10-22 PROBLEM — M25.552 HIP PAIN, BILATERAL: Status: ACTIVE | Noted: 2024-10-22

## 2024-10-22 PROBLEM — M25.551 HIP PAIN, BILATERAL: Status: ACTIVE | Noted: 2024-10-22

## 2024-10-22 PROBLEM — J30.2 SEASONAL ALLERGIES: Status: ACTIVE | Noted: 2024-10-22

## 2024-10-22 PROBLEM — M54.32 SCIATICA OF LEFT SIDE: Status: RESOLVED | Noted: 2024-10-22 | Resolved: 2024-10-22

## 2024-10-22 PROBLEM — R19.8 IRREGULAR BOWEL HABITS: Status: ACTIVE | Noted: 2024-10-22

## 2024-10-22 PROBLEM — M54.16 LUMBAR RADICULAR PAIN: Status: ACTIVE | Noted: 2024-10-22

## 2024-10-22 PROBLEM — M54.9 BACK PAIN: Status: ACTIVE | Noted: 2024-10-22

## 2024-10-22 PROBLEM — N20.0 BILATERAL KIDNEY STONES: Status: RESOLVED | Noted: 2024-10-22 | Resolved: 2024-10-22

## 2024-10-22 PROCEDURE — 99214 OFFICE O/P EST MOD 30 MIN: CPT | Performed by: NURSE PRACTITIONER

## 2024-10-22 RX ORDER — SERTRALINE HYDROCHLORIDE 50 MG/1
50 TABLET, FILM COATED ORAL DAILY
Qty: 30 TABLET | Refills: 5 | Status: SHIPPED | OUTPATIENT
Start: 2024-10-22 | End: 2024-10-24 | Stop reason: SINTOL

## 2024-10-22 RX ORDER — ONDANSETRON 4 MG/1
4 TABLET, ORALLY DISINTEGRATING ORAL EVERY 8 HOURS PRN
Qty: 30 TABLET | Refills: 0 | Status: SHIPPED | OUTPATIENT
Start: 2024-10-22 | End: 2024-11-01

## 2024-10-22 RX ORDER — LISINOPRIL 5 MG/1
5 TABLET ORAL DAILY
Qty: 100 TABLET | Refills: 3 | Status: SHIPPED | OUTPATIENT
Start: 2024-10-22 | End: 2025-11-26

## 2024-10-22 ASSESSMENT — ENCOUNTER SYMPTOMS
BLOOD IN STOOL: 0
VOMITING: 1
FEVER: 0
NERVOUS/ANXIOUS: 1
SHORTNESS OF BREATH: 1
DIARRHEA: 1
FATIGUE: 1
CONSTIPATION: 1
ABDOMINAL PAIN: 1
NAUSEA: 1
CHILLS: 0
SLEEP DISTURBANCE: 1
APPETITE CHANGE: 1
PALPITATIONS: 1
LIGHT-HEADEDNESS: 1
HEADACHES: 0

## 2024-10-22 NOTE — ASSESSMENT & PLAN NOTE
This is long-standing, but worsening on recent labs. She has never had therapeutic infusion. Advised her to call her hematologist for follow-up.

## 2024-10-22 NOTE — PROGRESS NOTES
Subjective   Rubi Leal is a 54 y.o. female who presents for Hospital Follow-up (Patient at [facility] [place]24 for Cyclic Vomiting Syndrome. Patient was discharged on Zofran and advised to follow-up with PCP. This is a recurring issue for this patient. ) and Hypertension (Patient has been having issues with blood pressure. Patient started breaking Lisinopril in half over the summer due to not being able to be seen by provider, started taking as directed this past weekend and patient has been having episodes of being light headed, blackout almost. ).  HPI  Review of Systems   Constitutional:  Positive for appetite change and fatigue. Negative for chills and fever.   Respiratory:  Positive for shortness of breath (during episodes of stress and anxiety).    Cardiovascular:  Positive for chest pain (intermittent episodes when she feels very stressed. States it feels like an elephant sitting on her chest.) and palpitations (occasional).   Gastrointestinal:  Positive for abdominal pain (cramping), constipation, diarrhea, nausea and vomiting. Negative for blood in stool.   Neurological:  Positive for syncope (Near-syncope when standing up. States she has decreased her lisinopril to 10 mg QOD d/t feeling lightheaded and like everything going black when she stands up.) and light-headedness (frequently). Negative for headaches.   Psychiatric/Behavioral:  Positive for sleep disturbance (occasional restless sleep a couple times per week.). The patient is nervous/anxious.      Objective   Physical Exam  Vitals reviewed.   Constitutional:       Appearance: Normal appearance.   HENT:      Head: Normocephalic.   Eyes:      Conjunctiva/sclera: Conjunctivae normal.   Cardiovascular:      Rate and Rhythm: Normal rate and regular rhythm.      Pulses: Normal pulses.      Heart sounds: No murmur heard.  Pulmonary:      Effort: Pulmonary effort is normal.      Breath sounds: Normal breath sounds.   Abdominal:      " General: Bowel sounds are normal.      Palpations: Abdomen is soft.   Musculoskeletal:      Cervical back: Neck supple.      Right lower leg: No edema.      Left lower leg: No edema.   Skin:     General: Skin is warm and dry.   Neurological:      General: No focal deficit present.      Mental Status: She is alert and oriented to person, place, and time.   Psychiatric:         Mood and Affect: Mood normal.         Thought Content: Thought content normal.       /68   Pulse 82   Resp 18   Ht 1.651 m (5' 5\")   Wt 77.1 kg (170 lb)   SpO2 97%   BMI 28.29 kg/m²   Assessment/Plan   Problem List Items Addressed This Visit       HLD (hyperlipidemia)    Relevant Orders    Lipid panel    Polycythemia    Current Assessment & Plan     This is long-standing, but worsening on recent labs. She has never had therapeutic infusion. Advised her to call her hematologist for follow-up.         Vitamin D deficiency    Relevant Orders    Vitamin D 25-Hydroxy,Total (for eval of Vitamin D levels)    Cyclic vomiting syndrome - Primary    Current Assessment & Plan     Refilled zofran Rx. F/U w/ GI.         Relevant Orders    Follow Up In [facility] - PCP - Established    Generalized anxiety disorder    Relevant Medications    sertraline (Zoloft) 50 mg tablet    Other Relevant Orders    Follow Up In [facility] - PCP - Established     Other Visit Diagnoses       Primary hypertension        Relevant Medications    lisinopril 5 mg tablet    Chest pain, unspecified type        Relevant Orders    Referral to Cardiology    Follow Up In [facility] - PCP - Established    Nausea and vomiting, unspecified vomiting type        Relevant Medications    ondansetron ODT (Zofran-ODT) 4 mg disintegrating tablet    Blood glucose elevated        Relevant Orders    Hemoglobin A1C    Elevated blood sugar        Relevant Orders    Hemoglobin A1C    Screening for thyroid disorder        Relevant Orders    TSH with reflex " to Free T4 if abnormal

## 2024-10-23 ENCOUNTER — TELEPHONE (OUTPATIENT)
Dept: PRIMARY CARE | Facility: CLINIC | Age: 54
End: 2024-10-23
Payer: COMMERCIAL

## 2024-10-23 NOTE — TELEPHONE ENCOUNTER
Patient has an office with you on 10/22/2024, for cyclic vomiting syndrome. Patient has been taking Zofran PRN for nausea and vomiting. Patient states she was started on Zoloft for Anxiety after her office visit.   3 hours after taking Zoloft yesterday, patient felt extremely nauseous. She would like to know if there is a specific way she should take the two medications, or if there is something else she may take.

## 2024-10-24 ENCOUNTER — LAB (OUTPATIENT)
Dept: LAB | Facility: LAB | Age: 54
End: 2024-10-24
Payer: COMMERCIAL

## 2024-10-24 DIAGNOSIS — E78.5 HYPERLIPIDEMIA, UNSPECIFIED HYPERLIPIDEMIA TYPE: ICD-10-CM

## 2024-10-24 DIAGNOSIS — Z13.29 SCREENING FOR THYROID DISORDER: ICD-10-CM

## 2024-10-24 DIAGNOSIS — E55.9 VITAMIN D DEFICIENCY: ICD-10-CM

## 2024-10-24 DIAGNOSIS — R73.9 ELEVATED BLOOD SUGAR: ICD-10-CM

## 2024-10-24 DIAGNOSIS — F41.1 GENERALIZED ANXIETY DISORDER: Primary | ICD-10-CM

## 2024-10-24 DIAGNOSIS — R73.9 BLOOD GLUCOSE ELEVATED: ICD-10-CM

## 2024-10-24 LAB
25(OH)D3 SERPL-MCNC: 28 NG/ML (ref 30–100)
CHOLEST SERPL-MCNC: 146 MG/DL (ref 0–199)
CHOLESTEROL/HDL RATIO: 3.5
EST. AVERAGE GLUCOSE BLD GHB EST-MCNC: 146 MG/DL
HBA1C MFR BLD: 6.7 %
HDLC SERPL-MCNC: 41.3 MG/DL
LDLC SERPL CALC-MCNC: 85 MG/DL
NON HDL CHOLESTEROL: 105 MG/DL (ref 0–149)
TRIGL SERPL-MCNC: 100 MG/DL (ref 0–149)
TSH SERPL-ACNC: 1.52 MIU/L (ref 0.44–3.98)
VLDL: 20 MG/DL (ref 0–40)

## 2024-10-24 PROCEDURE — 82306 VITAMIN D 25 HYDROXY: CPT

## 2024-10-24 PROCEDURE — 84443 ASSAY THYROID STIM HORMONE: CPT

## 2024-10-24 PROCEDURE — 36415 COLL VENOUS BLD VENIPUNCTURE: CPT

## 2024-10-24 PROCEDURE — 80061 LIPID PANEL: CPT

## 2024-10-24 PROCEDURE — 83036 HEMOGLOBIN GLYCOSYLATED A1C: CPT

## 2024-10-24 RX ORDER — BUSPIRONE HYDROCHLORIDE 5 MG/1
5 TABLET ORAL 2 TIMES DAILY
Qty: 60 TABLET | Refills: 11 | Status: SHIPPED | OUTPATIENT
Start: 2024-10-24 | End: 2025-10-24

## 2024-10-28 PROBLEM — E11.9 TYPE 2 DIABETES MELLITUS WITHOUT COMPLICATION, WITHOUT LONG-TERM CURRENT USE OF INSULIN (MULTI): Status: ACTIVE | Noted: 2024-10-28

## 2024-11-06 ENCOUNTER — APPOINTMENT (OUTPATIENT)
Dept: CARDIOLOGY | Facility: CLINIC | Age: 54
End: 2024-11-06
Payer: COMMERCIAL

## 2024-11-06 VITALS
BODY MASS INDEX: 27.56 KG/M2 | HEIGHT: 66 IN | DIASTOLIC BLOOD PRESSURE: 80 MMHG | SYSTOLIC BLOOD PRESSURE: 146 MMHG | HEART RATE: 60 BPM | WEIGHT: 171.5 LBS

## 2024-11-06 DIAGNOSIS — E11.9 TYPE 2 DIABETES MELLITUS WITHOUT COMPLICATION, WITHOUT LONG-TERM CURRENT USE OF INSULIN (MULTI): ICD-10-CM

## 2024-11-06 DIAGNOSIS — Z76.89 ENCOUNTER TO ESTABLISH CARE WITH NEW DOCTOR: ICD-10-CM

## 2024-11-06 DIAGNOSIS — R07.9 CHEST PAIN, UNSPECIFIED TYPE: ICD-10-CM

## 2024-11-06 DIAGNOSIS — Z13.6 SCREENING FOR ISCHEMIC HEART DISEASE (IHD): ICD-10-CM

## 2024-11-06 DIAGNOSIS — I10 ESSENTIAL HYPERTENSION: ICD-10-CM

## 2024-11-06 DIAGNOSIS — E78.2 MIXED HYPERLIPIDEMIA: ICD-10-CM

## 2024-11-06 DIAGNOSIS — F17.200 CURRENT EVERY DAY SMOKER: ICD-10-CM

## 2024-11-06 DIAGNOSIS — R06.02 SHORTNESS OF BREATH: ICD-10-CM

## 2024-11-06 DIAGNOSIS — F41.1 GENERALIZED ANXIETY DISORDER: ICD-10-CM

## 2024-11-06 PROCEDURE — 3008F BODY MASS INDEX DOCD: CPT | Performed by: INTERNAL MEDICINE

## 2024-11-06 PROCEDURE — 3077F SYST BP >= 140 MM HG: CPT | Performed by: INTERNAL MEDICINE

## 2024-11-06 PROCEDURE — 3044F HG A1C LEVEL LT 7.0%: CPT | Performed by: INTERNAL MEDICINE

## 2024-11-06 PROCEDURE — 99204 OFFICE O/P NEW MOD 45 MIN: CPT | Performed by: INTERNAL MEDICINE

## 2024-11-06 PROCEDURE — 3079F DIAST BP 80-89 MM HG: CPT | Performed by: INTERNAL MEDICINE

## 2024-11-06 PROCEDURE — 3048F LDL-C <100 MG/DL: CPT | Performed by: INTERNAL MEDICINE

## 2024-11-06 PROCEDURE — 4010F ACE/ARB THERAPY RXD/TAKEN: CPT | Performed by: INTERNAL MEDICINE

## 2024-11-06 RX ORDER — CLINDAMYCIN HYDROCHLORIDE 150 MG/1
150 CAPSULE ORAL 3 TIMES DAILY
COMMUNITY
Start: 2024-11-05

## 2024-11-06 RX ORDER — IBUPROFEN 600 MG/1
600 TABLET ORAL EVERY 8 HOURS PRN
COMMUNITY
Start: 2024-11-05

## 2024-11-06 ASSESSMENT — ENCOUNTER SYMPTOMS
SHORTNESS OF BREATH: 1
DYSPNEA ON EXERTION: 1

## 2024-11-06 NOTE — PROGRESS NOTES
CARDIOLOGY OFFICE VISIT      CHIEF COMPLAINT  Chest discomfort    HISTORY OF PRESENT ILLNESS  The patient is being seen today because of chest discomfort.  She states that the chest discomfort is a retrosternal heaviness like feeling.  There is no radiation of discomfort.  This occurs when she is under stress or becomes very anxious and nervous.  She is vague about the exact duration of the discomfort.  She denies any similar discomfort with physical activities.  She states she does have dyspnea with exertion.  She denies orthopnea paroxysmal nocturnal dyspnea and pedal edema.  She denies any prolonged palpitations, presyncope, or syncope.  She does smoke 1/2 to 1 pack of cigarettes per day.  She does have a history of hypertension and hyperlipidemia under treatment.  She states she does have a history of diabetes mellitus type 2 being managed with medical therapy.  She denies any immediate family history of cardiac pathology.    Lipid profile: Cholesterol 146, HDL 41, LDL 85, triglycerides 100, done August 2024    EKG: Normal sinus rhythm, nonspecific ST-T changes, results discussed with patient, done 10/17/2024    Impression:  Chest Pain  Shortness of breath with exertion  Hypertension  Hyperlipidemia  Diabetes Mellitus, Type II  Generalized Anxiety   Overweight  Tobacco Abuse    Plan:  Walking nuclear stress test  CT coronary calcium score    Please excuse any errors in grammar or translation related to this dictation.  Voice recognition software was utilized to prepare this document.    Past Medical History  Past Medical History:   Diagnosis Date    Bilateral kidney stones 10/22/2024    Calculus of kidney     Multiple kidney stones    Carpal tunnel syndrome 10/22/2024    Encounter for follow-up examination after completed treatment for conditions other than malignant neoplasm 11/17/2021    Hospital discharge follow-up    Generalized abdominal pain 12/28/2021    Generalized abdominal pain    Hydronephrosis  10/22/2024    Menopausal and female climacteric states 12/17/2019    Perimenopausal    Osteophyte, vertebrae     Osteophyte of spine    Other muscle spasm 02/04/2022    Trapezius muscle spasm    Personal history of other diseases of the musculoskeletal system and connective tissue     History of degenerative disc disease    Personal history of other diseases of the nervous system and sense organs     History of tension headache    Personal history of other specified conditions     History of abdominal pain    Sciatica of left side 10/22/2024    Serous retinal detachment, bilateral     Bilateral retinal detachment    Skin lesion of face 10/22/2024    Strain of muscle, fascia and tendon at neck level, initial encounter 03/09/2022    Strain of sternocleidomastoid muscle, initial encounter    Strain of other muscles, fascia and tendons at shoulder and upper arm level, left arm, initial encounter 03/09/2022    Strain of left trapezius muscle, initial encounter       Social History  Social History     Tobacco Use    Smoking status: Every Day     Current packs/day: 0.50     Types: Cigarettes     Passive exposure: Current    Smokeless tobacco: Never   Vaping Use    Vaping status: Never Used   Substance Use Topics    Alcohol use: Yes     Comment: occasionally    Drug use: Not Currently       Family History   No family history on file.     Allergies:  Allergies   Allergen Reactions    Cefdinir GI Upset    Erythromycin Nausea/vomiting     vomiting    Penicillins Hives, Swelling and Other        Outpatient Medications:  Current Outpatient Medications   Medication Instructions    albuterol (Proventil HFA) 90 mcg/actuation inhaler 2 puffs, inhalation, Every 4 hours PRN    amLODIPine (Norvasc) 5 mg tablet TAKE 1 TABLET BY MOUTH EVERY DAY    atorvastatin (LIPITOR) 10 mg, oral, Daily    busPIRone (BUSPAR) 5 mg, oral, 2 times daily    cetirizine (ZyrTEC) 10 mg tablet 1 tablet, Daily    cholecalciferol, vitamin D3, 25 mcg (1,000 unit)  tablet,chewable 1 tablet, Daily    clindamycin (CLEOCIN) 150 mg, 3 times daily    fluticasone (Flonase) 50 mcg/actuation nasal spray SPRAY 2 SPRAYS INTO EACH NOSTRIL EVERY DAY    ibuprofen 600 mg, Every 8 hours PRN    lisinopril 5 mg, oral, Daily    ondansetron ODT (ZOFRAN-ODT) 4 mg, oral, Every 8 hours PRN    pantoprazole (ProtoNix) 40 mg EC tablet TAKE 1 TABLET BY MOUTH EVERY DAY          REVIEW OF SYSTEMS  Review of Systems   Cardiovascular:  Positive for dyspnea on exertion.   Respiratory:  Positive for shortness of breath.    All other systems reviewed and are negative.        VITALS  Vitals:    11/06/24 1406   BP: 146/80   Pulse: 60       PHYSICAL EXAM  Vitals reviewed.   Constitutional:       Appearance: Normal and healthy appearance. Well-developed and not in distress.   Eyes:      Conjunctiva/sclera: Conjunctivae normal.      Pupils: Pupils are equal, round, and reactive to light.   Neck:      Vascular: No JVR. JVD normal.   Pulmonary:      Effort: Pulmonary effort is normal.      Breath sounds: Normal breath sounds. No wheezing. No rhonchi. No rales.   Chest:      Chest wall: Not tender to palpatation.   Cardiovascular:      PMI at left midclavicular line. Normal rate. Regular rhythm. Normal S1. Normal S2.       Murmurs: There is no murmur.      No gallop.  No click. No rub.   Pulses:     Intact distal pulses.   Edema:     Peripheral edema absent.   Abdominal:      Tenderness: There is no abdominal tenderness.   Musculoskeletal: Normal range of motion.         General: No tenderness.      Cervical back: Normal range of motion. Skin:     General: Skin is warm and dry.   Neurological:      General: No focal deficit present.      Mental Status: Alert and oriented to person, place and time.   Psychiatric:         Behavior: Behavior is cooperative.           ASSESSMENT AND PLAN  Diagnoses and all orders for this visit:  Chest pain, unspecified type  -     Referral to Cardiology  Mixed hyperlipidemia  Essential  hypertension  Type 2 diabetes mellitus without complication, without long-term current use of insulin (Multi)  BMI 28.0-28.9,adult  Current every day smoker  Encounter to establish care with new doctor  Generalized anxiety disorder      [unfilled]

## 2024-11-06 NOTE — PATIENT INSTRUCTIONS
Patient to follow up after stress test with Dr. Horace Lomax MD      Office will arrange CT Calcium Scoring, and Nuclear Exercise Stress Test in near future.     No other changes today.   Continue same medications and treatments.   Patient educated on proper medication use.   Patient educated on risk factor modification.   Please bring any lab results from other providers / physicians to your next appointment.     Please bring all medicines, vitamins, and herbal supplements with you when you come to the office.     Prescriptions will not be filled unless you are compliant with your follow up appointments or have a follow up appointment scheduled as per instruction of your physician. Refills should be requested at the time of your visit.    IRicardo RN am scribing for and in the presence of Dr. Horace Cho MD     ambulate

## 2024-11-22 ENCOUNTER — APPOINTMENT (OUTPATIENT)
Dept: PRIMARY CARE | Facility: CLINIC | Age: 54
End: 2024-11-22
Payer: COMMERCIAL

## 2024-11-26 ENCOUNTER — APPOINTMENT (OUTPATIENT)
Dept: RADIOLOGY | Facility: CLINIC | Age: 54
End: 2024-11-26
Payer: COMMERCIAL

## 2024-11-26 ENCOUNTER — APPOINTMENT (OUTPATIENT)
Dept: CARDIOLOGY | Facility: CLINIC | Age: 54
End: 2024-11-26
Payer: COMMERCIAL

## 2024-12-12 ENCOUNTER — HOSPITAL ENCOUNTER (OUTPATIENT)
Dept: RADIOLOGY | Facility: CLINIC | Age: 54
Discharge: HOME | End: 2024-12-12
Payer: COMMERCIAL

## 2024-12-12 ENCOUNTER — HOSPITAL ENCOUNTER (OUTPATIENT)
Dept: CARDIOLOGY | Facility: CLINIC | Age: 54
Discharge: HOME | End: 2024-12-12
Payer: COMMERCIAL

## 2024-12-12 DIAGNOSIS — R07.9 CHEST PAIN, UNSPECIFIED TYPE: ICD-10-CM

## 2024-12-12 DIAGNOSIS — R06.02 SHORTNESS OF BREATH: ICD-10-CM

## 2024-12-12 DIAGNOSIS — I10 ESSENTIAL HYPERTENSION: ICD-10-CM

## 2024-12-12 PROCEDURE — A9502 TC99M TETROFOSMIN: HCPCS | Performed by: INTERNAL MEDICINE

## 2024-12-12 PROCEDURE — 3430000001 HC RX 343 DIAGNOSTIC RADIOPHARMACEUTICALS: Performed by: INTERNAL MEDICINE

## 2024-12-12 PROCEDURE — 78452 HT MUSCLE IMAGE SPECT MULT: CPT

## 2024-12-12 PROCEDURE — 93017 CV STRESS TEST TRACING ONLY: CPT

## 2024-12-16 ENCOUNTER — TELEPHONE (OUTPATIENT)
Dept: CARDIOLOGY | Facility: CLINIC | Age: 54
End: 2024-12-16
Payer: COMMERCIAL

## 2024-12-16 NOTE — TELEPHONE ENCOUNTER
----- Message from Nurse Rubi WATTS sent at 12/16/2024  9:53 AM EST -----    ----- Message -----  From: Horace Cho MD  Sent: 12/16/2024   9:17 AM EST  To: Rubi Ding LPN    Nuclear stress test normal  ----- Message -----  From: Interface, Radiology Results In  Sent: 12/16/2024   8:22 AM EST  To: Horace Cho MD

## 2024-12-20 ENCOUNTER — OFFICE VISIT (OUTPATIENT)
Dept: PRIMARY CARE | Facility: CLINIC | Age: 54
End: 2024-12-20
Payer: COMMERCIAL

## 2024-12-20 VITALS
OXYGEN SATURATION: 96 % | WEIGHT: 174 LBS | BODY MASS INDEX: 27.97 KG/M2 | HEART RATE: 65 BPM | SYSTOLIC BLOOD PRESSURE: 128 MMHG | DIASTOLIC BLOOD PRESSURE: 72 MMHG | RESPIRATION RATE: 18 BRPM | HEIGHT: 66 IN

## 2024-12-20 DIAGNOSIS — I10 ESSENTIAL (PRIMARY) HYPERTENSION: ICD-10-CM

## 2024-12-20 DIAGNOSIS — D75.1 POLYCYTHEMIA: ICD-10-CM

## 2024-12-20 DIAGNOSIS — I10 ESSENTIAL HYPERTENSION: ICD-10-CM

## 2024-12-20 DIAGNOSIS — E11.9 TYPE 2 DIABETES MELLITUS WITHOUT COMPLICATION, WITHOUT LONG-TERM CURRENT USE OF INSULIN (MULTI): Primary | ICD-10-CM

## 2024-12-20 DIAGNOSIS — F41.1 GENERALIZED ANXIETY DISORDER: ICD-10-CM

## 2024-12-20 DIAGNOSIS — F17.200 CURRENT EVERY DAY SMOKER: ICD-10-CM

## 2024-12-20 DIAGNOSIS — E78.2 MIXED HYPERLIPIDEMIA: ICD-10-CM

## 2024-12-20 PROCEDURE — 99214 OFFICE O/P EST MOD 30 MIN: CPT | Performed by: NURSE PRACTITIONER

## 2024-12-20 RX ORDER — ALBUTEROL SULFATE 90 UG/1
2 INHALANT RESPIRATORY (INHALATION) EVERY 4 HOURS PRN
Qty: 6.7 G | Refills: 3 | Status: SHIPPED | OUTPATIENT
Start: 2024-12-20 | End: 2025-01-19

## 2024-12-20 RX ORDER — BUSPIRONE HYDROCHLORIDE 5 MG/1
5 TABLET ORAL 2 TIMES DAILY
Qty: 180 TABLET | Refills: 3 | Status: SHIPPED | OUTPATIENT
Start: 2024-12-20 | End: 2025-12-20

## 2024-12-20 RX ORDER — AMLODIPINE BESYLATE 5 MG/1
5 TABLET ORAL DAILY
Qty: 90 TABLET | Refills: 3 | Status: SHIPPED | OUTPATIENT
Start: 2024-12-20 | End: 2025-12-20

## 2024-12-20 ASSESSMENT — ENCOUNTER SYMPTOMS
COUGH: 0
FATIGUE: 0
SHORTNESS OF BREATH: 0
PALPITATIONS: 0
APPETITE CHANGE: 0
UNEXPECTED WEIGHT CHANGE: 0

## 2024-12-20 NOTE — ASSESSMENT & PLAN NOTE
She has cut back to 1/2 ppd from a prior 1 ppd. She is interested in quitting and has never used nicotine gum or patches or oral medications. She would like to keep trying to quit on her own and will return after the holidays if she needs assistance.   Advised to call 1-800-QUIT NOW for additional supports.

## 2024-12-20 NOTE — ASSESSMENT & PLAN NOTE
Currently controlled with improvement in her diet and exercise. Consider adding metformin at next visit when her A1C is repeated.   Discussed basic pathophysiology of type 2 diabetes. Pt. Advised to engage in at least 150 minutes of moderate intensity exercise per week and to avoid concentrated sweets, limit consumption of potatoes, pasta, rice, and bread, and to attempt to eat small, frequent meals. Pt. Encouraged to be mindful of portion size. Advised pt. To get annual diabetic eye exam to check for retinopathy.  Pt. Instructed to check her feet thoroughly for any sores, wounds, skin or nail infections daily and report any abnormal findings promptly.   Discussed symptoms of hypo- and hyperglycemia.  Advised patient to consume 5 small meals daily rather than 2-3 large meals. Instructed her to avoid concentrated sweets, including soda, fruit juices, baked goods, candy.   Multiple educational materials sent to patient via [employer].

## 2024-12-20 NOTE — PROGRESS NOTES
"Subjective   Rubi Leal is a 54 y.o. female who presents for Follow-up (Patient is here to follow-up with recent lab results. ).  Dictation #1  MRN:[id number]  [facility]:[id number]   HPI  Review of Systems   Constitutional:  Negative for appetite change, fatigue and unexpected weight change.   Respiratory:  Negative for cough and shortness of breath.    Cardiovascular:  Negative for chest pain, palpitations and leg swelling.     Objective     Physical Exam  Vitals reviewed.   Constitutional:       Appearance: Normal appearance.   HENT:      Head: Normocephalic.   Eyes:      Conjunctiva/sclera: Conjunctivae normal.   Cardiovascular:      Rate and Rhythm: Normal rate and regular rhythm.      Pulses: Normal pulses.      Heart sounds: No murmur heard.  Pulmonary:      Effort: Pulmonary effort is normal.      Breath sounds: Normal breath sounds.   Abdominal:      General: Bowel sounds are normal.      Palpations: Abdomen is soft.   Musculoskeletal:      Cervical back: Neck supple.      Right lower leg: No edema.      Left lower leg: No edema.   Skin:     General: Skin is warm and dry.   Neurological:      General: No focal deficit present.      Mental Status: She is alert and oriented to person, place, and time.   Psychiatric:         Mood and Affect: Mood normal.         Thought Content: Thought content normal.     /72   Pulse 65   Resp 18   Ht 1.676 m (5' 6\")   Wt 78.9 kg (174 lb)   SpO2 96%   BMI 28.08 kg/m²     Assessment/Plan     Problem List Items Addressed This Visit       Essential hypertension    Overview     Managed w/ lisinopril 5 mg and amlodipine 5 mg         Current Assessment & Plan     Controlled. Will continue current treatment plan.           HLD (hyperlipidemia)    Current Assessment & Plan     Reviewed results of recent lipid panel with patient. Well controlled on current regimen, which she will continue.          Polycythemia    Current Assessment & Plan     Discussed recent lab results " with patient, including the polythemia. This is not a new finding, and she does follow with hematology annually, next scheduled 1/2025. The patient does not have a clear understanding of this condition. She was advised to make a list of her questions and discuss them with hematology at her visit next month.          Generalized anxiety disorder    Relevant Medications    busPIRone (Buspar) 5 mg tablet    Type 2 diabetes mellitus without complication, without long-term current use of insulin (Multi) - Primary    Overview     New diagnosis 10/28/2024. Hgb A1C 6.7%  Currently diet controlled. Taking a statin and ACEi.         Current Assessment & Plan     Currently controlled with improvement in her diet and exercise. Consider adding metformin at next visit when her A1C is repeated.   Discussed basic pathophysiology of type 2 diabetes. Pt. Advised to engage in at least 150 minutes of moderate intensity exercise per week and to avoid concentrated sweets, limit consumption of potatoes, pasta, rice, and bread, and to attempt to eat small, frequent meals. Pt. Encouraged to be mindful of portion size. Advised pt. To get annual diabetic eye exam to check for retinopathy.  Pt. Instructed to check her feet thoroughly for any sores, wounds, skin or nail infections daily and report any abnormal findings promptly.   Discussed symptoms of hypo- and hyperglycemia.  Advised patient to consume 5 small meals daily rather than 2-3 large meals. Instructed her to avoid concentrated sweets, including soda, fruit juices, baked goods, candy.   Multiple educational materials sent to patient via [employer].         Relevant Orders    Follow Up In [facility] - PCP - Established    Current every day smoker    Current Assessment & Plan     She has cut back to 1/2 ppd from a prior 1 ppd. She is interested in quitting and has never used nicotine gum or patches or oral medications. She would like to keep trying to quit on her own and will  return after the holidays if she needs assistance.   Advised to call [phone]-QUIT NOW for additional supports.          Relevant Medications    albuterol (Proventil HFA) 90 mcg/actuation inhaler     Other Visit Diagnoses       Essential (primary) hypertension        Relevant Medications    amLODIPine (Norvasc) 5 mg tablet

## 2024-12-21 NOTE — ASSESSMENT & PLAN NOTE
Reviewed results of recent lipid panel with patient. Well controlled on current regimen, which she will continue.

## 2024-12-22 DIAGNOSIS — I10 ESSENTIAL (PRIMARY) HYPERTENSION: ICD-10-CM

## 2024-12-23 RX ORDER — LISINOPRIL 20 MG/1
TABLET ORAL
Qty: 90 TABLET | Refills: 1 | OUTPATIENT
Start: 2024-12-23

## 2024-12-27 NOTE — TELEPHONE ENCOUNTER
Spouse Dejan wanted to update Zee Quach is experiencing problems with abdominal pain and cramping.  (The usual symptoms that she has been dealing with for the past 20 years.)

## 2024-12-29 ENCOUNTER — HOSPITAL ENCOUNTER (EMERGENCY)
Facility: HOSPITAL | Age: 54
Discharge: HOME | End: 2024-12-29
Attending: EMERGENCY MEDICINE
Payer: COMMERCIAL

## 2024-12-29 ENCOUNTER — APPOINTMENT (OUTPATIENT)
Dept: RADIOLOGY | Facility: HOSPITAL | Age: 54
End: 2024-12-29
Payer: COMMERCIAL

## 2024-12-29 VITALS
BODY MASS INDEX: 29.16 KG/M2 | HEART RATE: 78 BPM | WEIGHT: 175 LBS | RESPIRATION RATE: 16 BRPM | SYSTOLIC BLOOD PRESSURE: 132 MMHG | HEIGHT: 65 IN | DIASTOLIC BLOOD PRESSURE: 68 MMHG | TEMPERATURE: 98.8 F | OXYGEN SATURATION: 98 %

## 2024-12-29 DIAGNOSIS — R10.84 GENERALIZED ABDOMINAL PAIN: Primary | ICD-10-CM

## 2024-12-29 LAB
ALBUMIN SERPL BCP-MCNC: 5.2 G/DL (ref 3.4–5)
ALP SERPL-CCNC: 87 U/L (ref 33–110)
ALT SERPL W P-5'-P-CCNC: 20 U/L (ref 7–45)
ANION GAP SERPL CALC-SCNC: 18 MMOL/L (ref 10–20)
APPEARANCE UR: CLEAR
AST SERPL W P-5'-P-CCNC: 18 U/L (ref 9–39)
BASOPHILS # BLD AUTO: 0.06 X10*3/UL (ref 0–0.1)
BASOPHILS NFR BLD AUTO: 0.4 %
BILIRUB SERPL-MCNC: 0.9 MG/DL (ref 0–1.2)
BILIRUB UR STRIP.AUTO-MCNC: NEGATIVE MG/DL
BUN SERPL-MCNC: 17 MG/DL (ref 6–23)
CALCIUM SERPL-MCNC: 10.2 MG/DL (ref 8.6–10.3)
CHLORIDE SERPL-SCNC: 101 MMOL/L (ref 98–107)
CO2 SERPL-SCNC: 21 MMOL/L (ref 21–32)
COLOR UR: ABNORMAL
CREAT SERPL-MCNC: 0.65 MG/DL (ref 0.5–1.05)
EGFRCR SERPLBLD CKD-EPI 2021: >90 ML/MIN/1.73M*2
EOSINOPHIL # BLD AUTO: 0 X10*3/UL (ref 0–0.7)
EOSINOPHIL NFR BLD AUTO: 0 %
ERYTHROCYTE [DISTWIDTH] IN BLOOD BY AUTOMATED COUNT: 12.1 % (ref 11.5–14.5)
GLUCOSE SERPL-MCNC: 183 MG/DL (ref 74–99)
GLUCOSE UR STRIP.AUTO-MCNC: NORMAL MG/DL
HCT VFR BLD AUTO: 52.1 % (ref 36–46)
HGB BLD-MCNC: 18.1 G/DL (ref 12–16)
HOLD SPECIMEN: NORMAL
IMM GRANULOCYTES # BLD AUTO: 0.07 X10*3/UL (ref 0–0.7)
IMM GRANULOCYTES NFR BLD AUTO: 0.4 % (ref 0–0.9)
KETONES UR STRIP.AUTO-MCNC: ABNORMAL MG/DL
LACTATE SERPL-SCNC: 2.2 MMOL/L (ref 0.4–2)
LACTATE SERPL-SCNC: 3 MMOL/L (ref 0.4–2)
LEUKOCYTE ESTERASE UR QL STRIP.AUTO: NEGATIVE
LIPASE SERPL-CCNC: 13 U/L (ref 9–82)
LYMPHOCYTES # BLD AUTO: 1.47 X10*3/UL (ref 1.2–4.8)
LYMPHOCYTES NFR BLD AUTO: 9.2 %
MCH RBC QN AUTO: 30.5 PG (ref 26–34)
MCHC RBC AUTO-ENTMCNC: 34.7 G/DL (ref 32–36)
MCV RBC AUTO: 88 FL (ref 80–100)
MONOCYTES # BLD AUTO: 0.63 X10*3/UL (ref 0.1–1)
MONOCYTES NFR BLD AUTO: 4 %
MUCOUS THREADS #/AREA URNS AUTO: NORMAL /LPF
NEUTROPHILS # BLD AUTO: 13.7 X10*3/UL (ref 1.2–7.7)
NEUTROPHILS NFR BLD AUTO: 86 %
NITRITE UR QL STRIP.AUTO: NEGATIVE
NRBC BLD-RTO: 0 /100 WBCS (ref 0–0)
PH UR STRIP.AUTO: 6 [PH]
PLATELET # BLD AUTO: 314 X10*3/UL (ref 150–450)
POTASSIUM SERPL-SCNC: 3.4 MMOL/L (ref 3.5–5.3)
PROT SERPL-MCNC: 7.6 G/DL (ref 6.4–8.2)
PROT UR STRIP.AUTO-MCNC: ABNORMAL MG/DL
RBC # BLD AUTO: 5.94 X10*6/UL (ref 4–5.2)
RBC # UR STRIP.AUTO: ABNORMAL /UL
RBC #/AREA URNS AUTO: NORMAL /HPF
SODIUM SERPL-SCNC: 137 MMOL/L (ref 136–145)
SP GR UR STRIP.AUTO: 1.01
SQUAMOUS #/AREA URNS AUTO: NORMAL /HPF
UROBILINOGEN UR STRIP.AUTO-MCNC: NORMAL MG/DL
WBC # BLD AUTO: 15.9 X10*3/UL (ref 4.4–11.3)
WBC #/AREA URNS AUTO: NORMAL /HPF

## 2024-12-29 PROCEDURE — 83605 ASSAY OF LACTIC ACID: CPT | Performed by: EMERGENCY MEDICINE

## 2024-12-29 PROCEDURE — 36415 COLL VENOUS BLD VENIPUNCTURE: CPT | Performed by: EMERGENCY MEDICINE

## 2024-12-29 PROCEDURE — 96361 HYDRATE IV INFUSION ADD-ON: CPT

## 2024-12-29 PROCEDURE — 99285 EMERGENCY DEPT VISIT HI MDM: CPT | Mod: 25 | Performed by: EMERGENCY MEDICINE

## 2024-12-29 PROCEDURE — 80053 COMPREHEN METABOLIC PANEL: CPT | Performed by: EMERGENCY MEDICINE

## 2024-12-29 PROCEDURE — 74177 CT ABD & PELVIS W/CONTRAST: CPT | Performed by: RADIOLOGY

## 2024-12-29 PROCEDURE — 74177 CT ABD & PELVIS W/CONTRAST: CPT

## 2024-12-29 PROCEDURE — 2550000001 HC RX 255 CONTRASTS: Performed by: EMERGENCY MEDICINE

## 2024-12-29 PROCEDURE — 81001 URINALYSIS AUTO W/SCOPE: CPT | Performed by: EMERGENCY MEDICINE

## 2024-12-29 PROCEDURE — 2500000004 HC RX 250 GENERAL PHARMACY W/ HCPCS (ALT 636 FOR OP/ED): Performed by: EMERGENCY MEDICINE

## 2024-12-29 PROCEDURE — 96374 THER/PROPH/DIAG INJ IV PUSH: CPT | Mod: 59

## 2024-12-29 PROCEDURE — 85025 COMPLETE CBC W/AUTO DIFF WBC: CPT | Performed by: EMERGENCY MEDICINE

## 2024-12-29 PROCEDURE — 83690 ASSAY OF LIPASE: CPT | Performed by: EMERGENCY MEDICINE

## 2024-12-29 PROCEDURE — 96375 TX/PRO/DX INJ NEW DRUG ADDON: CPT

## 2024-12-29 RX ORDER — ONDANSETRON HYDROCHLORIDE 2 MG/ML
4 INJECTION, SOLUTION INTRAVENOUS ONCE
Status: COMPLETED | OUTPATIENT
Start: 2024-12-29 | End: 2024-12-29

## 2024-12-29 RX ORDER — ONDANSETRON 4 MG/1
4 TABLET, ORALLY DISINTEGRATING ORAL EVERY 8 HOURS PRN
Qty: 20 TABLET | Refills: 0 | Status: SHIPPED | OUTPATIENT
Start: 2024-12-29 | End: 2025-01-05

## 2024-12-29 RX ORDER — OXYCODONE HYDROCHLORIDE 5 MG/1
5 TABLET ORAL EVERY 8 HOURS PRN
Qty: 9 TABLET | Refills: 0 | Status: SHIPPED | OUTPATIENT
Start: 2024-12-29 | End: 2025-01-04

## 2024-12-29 RX ORDER — HYDROMORPHONE HYDROCHLORIDE 1 MG/ML
1 INJECTION, SOLUTION INTRAMUSCULAR; INTRAVENOUS; SUBCUTANEOUS ONCE
Status: COMPLETED | OUTPATIENT
Start: 2024-12-29 | End: 2024-12-29

## 2024-12-29 RX ADMIN — SODIUM CHLORIDE, POTASSIUM CHLORIDE, SODIUM LACTATE AND CALCIUM CHLORIDE 1000 ML: 600; 310; 30; 20 INJECTION, SOLUTION INTRAVENOUS at 19:38

## 2024-12-29 RX ADMIN — ONDANSETRON 4 MG: 2 INJECTION INTRAMUSCULAR; INTRAVENOUS at 18:41

## 2024-12-29 RX ADMIN — IOHEXOL 75 ML: 350 INJECTION, SOLUTION INTRAVENOUS at 19:40

## 2024-12-29 RX ADMIN — HYDROMORPHONE HYDROCHLORIDE 1 MG: 1 INJECTION, SOLUTION INTRAMUSCULAR; INTRAVENOUS; SUBCUTANEOUS at 18:42

## 2024-12-29 ASSESSMENT — PAIN DESCRIPTION - ORIENTATION: ORIENTATION: LOWER

## 2024-12-29 ASSESSMENT — COLUMBIA-SUICIDE SEVERITY RATING SCALE - C-SSRS
6. HAVE YOU EVER DONE ANYTHING, STARTED TO DO ANYTHING, OR PREPARED TO DO ANYTHING TO END YOUR LIFE?: NO
1. IN THE PAST MONTH, HAVE YOU WISHED YOU WERE DEAD OR WISHED YOU COULD GO TO SLEEP AND NOT WAKE UP?: NO
2. HAVE YOU ACTUALLY HAD ANY THOUGHTS OF KILLING YOURSELF?: NO

## 2024-12-29 ASSESSMENT — PAIN DESCRIPTION - LOCATION: LOCATION: ABDOMEN

## 2024-12-29 ASSESSMENT — LIFESTYLE VARIABLES
EVER FELT BAD OR GUILTY ABOUT YOUR DRINKING: NO
HAVE YOU EVER FELT YOU SHOULD CUT DOWN ON YOUR DRINKING: NO
EVER HAD A DRINK FIRST THING IN THE MORNING TO STEADY YOUR NERVES TO GET RID OF A HANGOVER: NO
TOTAL SCORE: 0
HAVE PEOPLE ANNOYED YOU BY CRITICIZING YOUR DRINKING: NO

## 2024-12-29 ASSESSMENT — PAIN SCALES - GENERAL
PAINLEVEL_OUTOF10: 0 - NO PAIN
PAINLEVEL_OUTOF10: 7

## 2024-12-29 ASSESSMENT — PAIN DESCRIPTION - DESCRIPTORS: DESCRIPTORS: CRAMPING

## 2024-12-29 ASSESSMENT — PAIN - FUNCTIONAL ASSESSMENT
PAIN_FUNCTIONAL_ASSESSMENT: 0-10
PAIN_FUNCTIONAL_ASSESSMENT: 0-10

## 2024-12-30 LAB — HOLD SPECIMEN: NORMAL

## 2024-12-30 NOTE — DISCHARGE INSTRUCTIONS
Return to the emergency room for any worsening concerning symptoms otherwise recommend following up with your GI doctor as directed.

## 2024-12-30 NOTE — ED PROVIDER NOTES
HPI   Chief Complaint   Patient presents with    Abdominal Pain     Lower abd., N/V x3 days.        This is a 54-year-old female who presents the emergency room with abdominal pain.  Patient states that over the past 20 years, every 3 to 4 months she will get this sharp stabbing abdominal pain that will come and go.  Usually gets really bad and she will to come to the emergency room but they try to deal with it at home.  She is seeing GI doctors, primary care doctors, specialist, has had endoscopies and colonoscopies has never been given the correct diagnosis or something that was consistent.  She was told sometimes it may be cyclic vomiting, was told it might be Crohn's or ulcerative colitis however all biopsies and examinations were negative.  She does know why it happens but it started 2 days ago, got better, and got worse today to the point where it was even worse than 2 days ago.  Comes the emergency room with stabbing cramping pain in lower abdomen.  She states normally she gets Zofran, fluids and pain medication the pain starts to go away and then eventually subsides altogether.    At this time, she denies any recent nausea vomiting or diarrhea issues, no fevers, no recent illness otherwise.  No blood in her stool.              Patient History   Past Medical History:   Diagnosis Date    Bilateral kidney stones 10/22/2024    Calculus of kidney     Multiple kidney stones    Carpal tunnel syndrome 10/22/2024    Encounter for follow-up examination after completed treatment for conditions other than malignant neoplasm 11/17/2021    Hospital discharge follow-up    Generalized abdominal pain 12/28/2021    Generalized abdominal pain    Hydronephrosis 10/22/2024    Menopausal and female climacteric states 12/17/2019    Perimenopausal    Osteophyte, vertebrae     Osteophyte of spine    Other muscle spasm 02/04/2022    Trapezius muscle spasm    Personal history of other diseases of the musculoskeletal system and  connective tissue     History of degenerative disc disease    Personal history of other diseases of the nervous system and sense organs     History of tension headache    Personal history of other specified conditions     History of abdominal pain    Sciatica of left side 10/22/2024    Serous retinal detachment, bilateral     Bilateral retinal detachment    Skin lesion of face 10/22/2024    Strain of muscle, fascia and tendon at neck level, initial encounter 03/09/2022    Strain of sternocleidomastoid muscle, initial encounter    Strain of other muscles, fascia and tendons at shoulder and upper arm level, left arm, initial encounter 03/09/2022    Strain of left trapezius muscle, initial encounter     Past Surgical History:   Procedure Laterality Date    CATARACT EXTRACTION, BILATERAL Bilateral     January, Left Eye & February, Right Eye  2023.    OTHER SURGICAL HISTORY  09/04/2019    Tonsillectomy with adenoidectomy    OTHER SURGICAL HISTORY  09/04/2019    Lithotripsy    OTHER SURGICAL HISTORY  09/04/2019    Carpal tunnel surgery    OTHER SURGICAL HISTORY  02/03/2021    Retinal detachment repair     No family history on file.  Social History     Tobacco Use    Smoking status: Every Day     Current packs/day: 0.50     Types: Cigarettes     Passive exposure: Current    Smokeless tobacco: Never   Vaping Use    Vaping status: Never Used   Substance Use Topics    Alcohol use: Yes     Comment: occasionally    Drug use: Not Currently       Physical Exam   ED Triage Vitals [12/29/24 1635]   Temperature Heart Rate Respirations BP   37.1 °C (98.8 °F) 77 20 (!) 184/92      Pulse Ox Temp Source Heart Rate Source Patient Position   98 % Temporal -- Sitting      BP Location FiO2 (%)     Right arm --       Physical Exam  Constitutional:       Appearance: She is well-developed and normal weight.   HENT:      Head: Normocephalic.   Cardiovascular:      Rate and Rhythm: Normal rate and regular rhythm.   Pulmonary:      Effort:  Pulmonary effort is normal.      Breath sounds: Normal breath sounds.   Abdominal:      General: Abdomen is flat. Bowel sounds are normal.      Palpations: Abdomen is soft.   Skin:     General: Skin is warm.      Capillary Refill: Capillary refill takes less than 2 seconds.   Neurological:      General: No focal deficit present.      Mental Status: She is alert and oriented to person, place, and time.           ED Course & MDM   Diagnoses as of 12/29/24 2120   Generalized abdominal pain                 No data recorded     [place] Coma Scale Score: 15 (12/29/24 1638 : Kayla Bardales RN)                           Medical Decision Making  The patient states that she feels under percent better now after the medications.  She is on IV fluids.  She is drinking.  The pain is down to 1 out of 10.  CT scan reveals no sign of acute intra-abdominal pathology.  Lactic acid was elevated however the patient is pain-free now.  She will be discharged home to follow-up with her primary care doctor or GI doctor as directed and to return for any worsening symptoms.        Procedure  Procedures     Blanco Strickland DO  12/29/24 2123

## 2024-12-31 ENCOUNTER — TELEPHONE (OUTPATIENT)
Dept: PRIMARY CARE | Facility: CLINIC | Age: 54
End: 2024-12-31
Payer: COMMERCIAL

## 2024-12-31 NOTE — TELEPHONE ENCOUNTER
Forwarding as an FYI.   Original message had been attached to a refill request that has been denied. I copied the new message and created this encounter.

## 2024-12-31 NOTE — TELEPHONE ENCOUNTER
Amanda Ngo   NR    12/27/24  1:38 PM  Note      Spouse Dejan wanted to update Zee Quach is experiencing problems with abdominal pain and cramping.  (The usual symptoms that she has been dealing with for the past 20 years.)

## 2025-01-10 ENCOUNTER — HOSPITAL ENCOUNTER (EMERGENCY)
Facility: HOSPITAL | Age: 55
Discharge: HOME | End: 2025-01-10
Payer: COMMERCIAL

## 2025-01-10 VITALS
HEIGHT: 65 IN | BODY MASS INDEX: 28.99 KG/M2 | DIASTOLIC BLOOD PRESSURE: 88 MMHG | TEMPERATURE: 97.7 F | WEIGHT: 174 LBS | SYSTOLIC BLOOD PRESSURE: 198 MMHG | RESPIRATION RATE: 18 BRPM | HEART RATE: 69 BPM | OXYGEN SATURATION: 100 %

## 2025-01-10 DIAGNOSIS — T25.129A SUPERFICIAL BURN OF FOOT, UNSPECIFIED LATERALITY, INITIAL ENCOUNTER: Primary | ICD-10-CM

## 2025-01-10 PROCEDURE — 2500000001 HC RX 250 WO HCPCS SELF ADMINISTERED DRUGS (ALT 637 FOR MEDICARE OP)

## 2025-01-10 PROCEDURE — 99283 EMERGENCY DEPT VISIT LOW MDM: CPT

## 2025-01-10 RX ORDER — BACITRACIN ZINC 500 UNIT/G
1 OINTMENT (GRAM) TOPICAL 2 TIMES DAILY
Qty: 1 G | Refills: 0 | Status: SHIPPED | OUTPATIENT
Start: 2025-01-10 | End: 2025-01-15

## 2025-01-10 RX ORDER — OXYCODONE AND ACETAMINOPHEN 5; 325 MG/1; MG/1
1 TABLET ORAL ONCE
Status: COMPLETED | OUTPATIENT
Start: 2025-01-10 | End: 2025-01-10

## 2025-01-10 RX ORDER — NAPROXEN SODIUM 550 MG/1
550 TABLET ORAL
Qty: 14 TABLET | Refills: 0 | Status: SHIPPED | OUTPATIENT
Start: 2025-01-10 | End: 2025-01-17

## 2025-01-10 RX ORDER — NAPROXEN 250 MG/1
500 TABLET ORAL ONCE
Status: COMPLETED | OUTPATIENT
Start: 2025-01-10 | End: 2025-01-10

## 2025-01-10 RX ORDER — OXYCODONE AND ACETAMINOPHEN 5; 325 MG/1; MG/1
1 TABLET ORAL EVERY 6 HOURS PRN
Qty: 12 TABLET | Refills: 0 | Status: SHIPPED | OUTPATIENT
Start: 2025-01-10 | End: 2025-01-13

## 2025-01-10 RX ADMIN — OXYCODONE HYDROCHLORIDE AND ACETAMINOPHEN 1 TABLET: 5; 325 TABLET ORAL at 10:13

## 2025-01-10 RX ADMIN — NAPROXEN 500 MG: 250 TABLET ORAL at 10:13

## 2025-01-10 ASSESSMENT — PAIN SCALES - GENERAL: PAINLEVEL_OUTOF10: 9

## 2025-01-10 ASSESSMENT — PAIN - FUNCTIONAL ASSESSMENT: PAIN_FUNCTIONAL_ASSESSMENT: 0-10

## 2025-01-10 ASSESSMENT — LIFESTYLE VARIABLES
EVER FELT BAD OR GUILTY ABOUT YOUR DRINKING: NO
TOTAL SCORE: 0
HAVE YOU EVER FELT YOU SHOULD CUT DOWN ON YOUR DRINKING: NO
EVER HAD A DRINK FIRST THING IN THE MORNING TO STEADY YOUR NERVES TO GET RID OF A HANGOVER: NO
HAVE PEOPLE ANNOYED YOU BY CRITICIZING YOUR DRINKING: NO

## 2025-01-10 NOTE — Clinical Note
Rubi Leal was seen and treated in our emergency department on 1/10/2025.  She may return to work on 01/15/2025.       If you have any questions or concerns, please don't hesitate to call.      Jake Napier PA-C

## 2025-01-10 NOTE — ED PROVIDER NOTES
"HPI   Chief Complaint   Patient presents with    Burn     \"Burned both feet with water from a steamer at work.  Left hurts more than the right.  Works at [employer].\"       History provided by: Patient    Limitations to history: None    CC: [place]    HPI: 54-year-old female with a history of diabetes with cyclic abdominal pain and nausea vomiting presents the emergency department to be evaluated for bilateral foot burns that occurred about 30 minutes prior to arrival.  Patient works for [employer].  She was emptying the  that contained hot dishes with hot water, states that hot water spilled on both of her feet causing burns to the bilateral feet especially the left compared to the right.  She has not taken anything for her pain prior to arrival.  Her tetanus is up-to-date.  She denies any blistering.  She reports redness warmth and pain associate with the burns.  Denies pain or injury elsewhere.  Denies all other systemic symptoms.    ROS: Negative unless mentioned in HPI    Medical Hx: Allergy to cefdinir, erythromycin, penicillin.  Immunizations up-to-date.    Physical exam:    Constitutional: Patient is well-nourished and well-developed.  Appears to be uncomfortable but nontoxic in appearance.  Oriented to person, place, time, and situation.    HEENT: Head is normocephalic, atraumatic. Patient's airway is patent.  Tympanic membranes are clear bilaterally.  Nasal mucosa clear.  Mouth with normal mucosa.  Throat is not erythematous and there are no oropharyngeal exudates, uvula is midline.  No obvious facial deformities.    Eyes: Clear bilaterally.  Pupils are equal round and reactive to light and accommodation.  Extraocular movements intact.      Cardiac: Regular rate, regular rhythm.  Heart sounds S1, S2.  No murmurs, rubs, or gallops.  PMI nondisplaced.  No JVD.    Respiratory: Regular respiratory rate and effort.  Breath sounds are clear and equal bilaterally, no adventitious lung sounds.  " Patient is speaking in full sentences and is in no apparent respiratory distress. No use of accessory muscles.      Gastrointestinal: Abdomen is soft, nondistended, and nontender.  There are no obvious deformities.  No rebound tenderness or guarding.  Bowel sounds are normal active.    Genitourinary: No CVA or flank tenderness.    Musculoskeletal: Patient has first-degree superficial burns to the bilateral feet.  For the left foot it is localized to the dorsal aspect of the foot and involves the toes as well.  There is no blistering.  No skin sloughing.  Patient has first-degree superficial burn of the right foot as well however this is more localized to the lateral medial dorsal aspect rather than the left foot that affects most of the dorsal aspect of the foot.  Again there is no skin sloughing or blistering.  No obvious  bony deformities.  Patient has equal range of motion in all extremities and no strength deficiencies.  No back or neck tenderness.  Capillary refill less than 3 seconds.  Strong peripheral pulses.  No sensory deficits.    Neurological: Patient is alert and oriented.  No focal deficits.  5/5 strength in all extremities.  Cranial nerves II through XII intact. GCS15.     Skin: Skin is normal color for race and is warm, dry, and intact.  No evidence of trauma.  No lesions, rashes, bruising, jaundice, or masses.    Psych: Appropriate mood and affect.  No apparent risk to self or others.    Heme/lymph: No adenopathy, lymphadenopathy, or splenomegaly    Physical exam is otherwise negative unless stated above or in history of present illness.              Patient History   Past Medical History:   Diagnosis Date    Bilateral kidney stones 10/22/2024    Calculus of kidney     Multiple kidney stones    Carpal tunnel syndrome 10/22/2024    Encounter for follow-up examination after completed treatment for conditions other than malignant neoplasm 11/17/2021    Hospital discharge follow-up    Generalized  abdominal pain 12/28/2021    Generalized abdominal pain    Hydronephrosis 10/22/2024    Menopausal and female climacteric states 12/17/2019    Perimenopausal    Osteophyte, vertebrae     Osteophyte of spine    Other muscle spasm 02/04/2022    Trapezius muscle spasm    Personal history of other diseases of the musculoskeletal system and connective tissue     History of degenerative disc disease    Personal history of other diseases of the nervous system and sense organs     History of tension headache    Personal history of other specified conditions     History of abdominal pain    Sciatica of left side 10/22/2024    Serous retinal detachment, bilateral     Bilateral retinal detachment    Skin lesion of face 10/22/2024    Strain of muscle, fascia and tendon at neck level, initial encounter 03/09/2022    Strain of sternocleidomastoid muscle, initial encounter    Strain of other muscles, fascia and tendons at shoulder and upper arm level, left arm, initial encounter 03/09/2022    Strain of left trapezius muscle, initial encounter     Past Surgical History:   Procedure Laterality Date    CATARACT EXTRACTION, BILATERAL Bilateral     January, Left Eye & February, Right Eye  2023.    OTHER SURGICAL HISTORY  09/04/2019    Tonsillectomy with adenoidectomy    OTHER SURGICAL HISTORY  09/04/2019    Lithotripsy    OTHER SURGICAL HISTORY  09/04/2019    Carpal tunnel surgery    OTHER SURGICAL HISTORY  02/03/2021    Retinal detachment repair     No family history on file.  Social History     Tobacco Use    Smoking status: Every Day     Current packs/day: 0.50     Types: Cigarettes     Passive exposure: Current    Smokeless tobacco: Never   Vaping Use    Vaping status: Never Used   Substance Use Topics    Alcohol use: Yes     Comment: occasionally    Drug use: Never       Physical Exam   ED Triage Vitals [01/10/25 0928]   Temperature Heart Rate Respirations BP   36.5 °C (97.7 °F) 69 18 (!) 198/88      Pulse Ox Temp Source Heart  Rate Source Patient Position   100 % Temporal Monitor Sitting      BP Location FiO2 (%)     Right arm --       Physical Exam      ED Course & MDM   Diagnoses as of 01/10/25 0957   Superficial burn of foot, unspecified laterality, initial encounter     Patient updated on plan for lab testing, IV insertion, radiology imaging, and medications to be administered while in the ER (if indicated). Patient updated on expected wait times for testing and results. Patient provided my name and told to ask any staff member for questions or concerns if they should arise. Electronic medical record reviewed.     MDM    Upon initial assessment, patient appears to be uncomfortable but nontoxic in appearance.    Patient presented to the emergency department with the chief complaint bilateral feet burns.  Patient has first-degree superficial burns to the bilateral feet.  For the left foot it is localized to the dorsal aspect of the foot and involves the toes as well.  There is no blistering.  No skin sloughing.  Patient has first-degree superficial burn of the right foot as well however this is more localized to the lateral medial dorsal aspect rather than the left foot that affects most of the dorsal aspect of the foot.  Again there is no skin sloughing or blistering.  No obvious  bony deformities.  Patient has equal range of motion in all extremities and no strength deficiencies.  No back or neck tenderness.  Capillary refill less than 3 seconds.  Strong peripheral pulses.  No sensory deficits. On arrival to the emergency department, vital signs were significant for hypertension likely due to her acute discomfort.    Patient has no findings I would suggest a second third or fourth degree burn.  Patient was given naproxen and Percocet for her discomfort.    I gently cleaned the patient's burns and applied a Xeroform pad as well as other wound dressings.  She will be discharged with Anaprox, Percocet, and bacitracin.  I did review her  NESSS report.  She will call the [facility] burn clinic today to set up a follow-up appointment.  She was given wound care supplies to go home with.  I discussed supportive treatment.  All questions and concerns addressed.  Reasons to return to ER discussed.  I provided her with a note for work.  Patient verbalized understanding and agreement with the treatment plan and they remained hemodynamically stable in the ER.    This note was dictated using a speech recognition program.  While an attempt was made at proof-reading to minimize errors, minor errors in transcription may be present            No data recorded                                 Medical Decision Making      Procedure  Procedures     Jake Napier PA-C  01/10/25 1000       Jake Napier PA-C  01/10/25 1001       Jake Napier PA-C  01/10/25 1001

## 2025-01-24 ENCOUNTER — APPOINTMENT (OUTPATIENT)
Dept: RADIOLOGY | Facility: HOSPITAL | Age: 55
End: 2025-01-24
Payer: COMMERCIAL

## 2025-01-24 ENCOUNTER — HOSPITAL ENCOUNTER (EMERGENCY)
Facility: HOSPITAL | Age: 55
Discharge: HOME | End: 2025-01-24
Attending: EMERGENCY MEDICINE
Payer: COMMERCIAL

## 2025-01-24 ENCOUNTER — APPOINTMENT (OUTPATIENT)
Dept: CARDIOLOGY | Facility: HOSPITAL | Age: 55
End: 2025-01-24
Payer: COMMERCIAL

## 2025-01-24 VITALS
WEIGHT: 172 LBS | TEMPERATURE: 98.8 F | RESPIRATION RATE: 17 BRPM | HEIGHT: 65 IN | DIASTOLIC BLOOD PRESSURE: 85 MMHG | HEART RATE: 75 BPM | SYSTOLIC BLOOD PRESSURE: 175 MMHG | BODY MASS INDEX: 28.66 KG/M2 | OXYGEN SATURATION: 98 %

## 2025-01-24 DIAGNOSIS — R11.2 NAUSEA AND VOMITING, UNSPECIFIED VOMITING TYPE: Primary | ICD-10-CM

## 2025-01-24 LAB
ALBUMIN SERPL BCP-MCNC: 5 G/DL (ref 3.4–5)
ALP SERPL-CCNC: 87 U/L (ref 33–110)
ALT SERPL W P-5'-P-CCNC: 18 U/L (ref 7–45)
ANION GAP SERPL CALC-SCNC: 18 MMOL/L (ref 10–20)
AST SERPL W P-5'-P-CCNC: 16 U/L (ref 9–39)
BASOPHILS # BLD AUTO: 0.06 X10*3/UL (ref 0–0.1)
BASOPHILS NFR BLD AUTO: 0.5 %
BILIRUB SERPL-MCNC: 0.6 MG/DL (ref 0–1.2)
BUN SERPL-MCNC: 5 MG/DL (ref 6–23)
CALCIUM SERPL-MCNC: 9.5 MG/DL (ref 8.6–10.3)
CARDIAC TROPONIN I PNL SERPL HS: 10 NG/L (ref 0–13)
CARDIAC TROPONIN I PNL SERPL HS: 7 NG/L (ref 0–13)
CHLORIDE SERPL-SCNC: 101 MMOL/L (ref 98–107)
CO2 SERPL-SCNC: 22 MMOL/L (ref 21–32)
CREAT SERPL-MCNC: 0.57 MG/DL (ref 0.5–1.05)
EGFRCR SERPLBLD CKD-EPI 2021: >90 ML/MIN/1.73M*2
EOSINOPHIL # BLD AUTO: 0.03 X10*3/UL (ref 0–0.7)
EOSINOPHIL NFR BLD AUTO: 0.3 %
ERYTHROCYTE [DISTWIDTH] IN BLOOD BY AUTOMATED COUNT: 12.5 % (ref 11.5–14.5)
GLUCOSE SERPL-MCNC: 185 MG/DL (ref 74–99)
HCT VFR BLD AUTO: 51.9 % (ref 36–46)
HGB BLD-MCNC: 16.7 G/DL (ref 12–16)
IMM GRANULOCYTES # BLD AUTO: 0.13 X10*3/UL (ref 0–0.7)
IMM GRANULOCYTES NFR BLD AUTO: 1.1 % (ref 0–0.9)
LACTATE SERPL-SCNC: 3.1 MMOL/L (ref 0.4–2)
LACTATE SERPL-SCNC: 4 MMOL/L (ref 0.4–2)
LIPASE SERPL-CCNC: 8 U/L (ref 9–82)
LYMPHOCYTES # BLD AUTO: 1.09 X10*3/UL (ref 1.2–4.8)
LYMPHOCYTES NFR BLD AUTO: 9.4 %
MAGNESIUM SERPL-MCNC: 1.73 MG/DL (ref 1.6–2.4)
MCH RBC QN AUTO: 29.5 PG (ref 26–34)
MCHC RBC AUTO-ENTMCNC: 32.2 G/DL (ref 32–36)
MCV RBC AUTO: 92 FL (ref 80–100)
MONOCYTES # BLD AUTO: 0.45 X10*3/UL (ref 0.1–1)
MONOCYTES NFR BLD AUTO: 3.9 %
NEUTROPHILS # BLD AUTO: 9.78 X10*3/UL (ref 1.2–7.7)
NEUTROPHILS NFR BLD AUTO: 84.8 %
NRBC BLD-RTO: 0 /100 WBCS (ref 0–0)
PLATELET # BLD AUTO: 322 X10*3/UL (ref 150–450)
POTASSIUM SERPL-SCNC: 3.7 MMOL/L (ref 3.5–5.3)
PROT SERPL-MCNC: 7.6 G/DL (ref 6.4–8.2)
RBC # BLD AUTO: 5.67 X10*6/UL (ref 4–5.2)
SODIUM SERPL-SCNC: 137 MMOL/L (ref 136–145)
WBC # BLD AUTO: 11.5 X10*3/UL (ref 4.4–11.3)

## 2025-01-24 PROCEDURE — 96376 TX/PRO/DX INJ SAME DRUG ADON: CPT

## 2025-01-24 PROCEDURE — 83690 ASSAY OF LIPASE: CPT

## 2025-01-24 PROCEDURE — 36415 COLL VENOUS BLD VENIPUNCTURE: CPT | Performed by: EMERGENCY MEDICINE

## 2025-01-24 PROCEDURE — 99285 EMERGENCY DEPT VISIT HI MDM: CPT | Performed by: EMERGENCY MEDICINE

## 2025-01-24 PROCEDURE — 93005 ELECTROCARDIOGRAM TRACING: CPT

## 2025-01-24 PROCEDURE — 84484 ASSAY OF TROPONIN QUANT: CPT

## 2025-01-24 PROCEDURE — 74177 CT ABD & PELVIS W/CONTRAST: CPT

## 2025-01-24 PROCEDURE — 2550000001 HC RX 255 CONTRASTS: Performed by: EMERGENCY MEDICINE

## 2025-01-24 PROCEDURE — 80053 COMPREHEN METABOLIC PANEL: CPT

## 2025-01-24 PROCEDURE — 96374 THER/PROPH/DIAG INJ IV PUSH: CPT

## 2025-01-24 PROCEDURE — 83605 ASSAY OF LACTIC ACID: CPT | Performed by: EMERGENCY MEDICINE

## 2025-01-24 PROCEDURE — 96361 HYDRATE IV INFUSION ADD-ON: CPT

## 2025-01-24 PROCEDURE — 36415 COLL VENOUS BLD VENIPUNCTURE: CPT

## 2025-01-24 PROCEDURE — 85025 COMPLETE CBC W/AUTO DIFF WBC: CPT

## 2025-01-24 PROCEDURE — 96375 TX/PRO/DX INJ NEW DRUG ADDON: CPT

## 2025-01-24 PROCEDURE — 74177 CT ABD & PELVIS W/CONTRAST: CPT | Performed by: RADIOLOGY

## 2025-01-24 PROCEDURE — 2500000001 HC RX 250 WO HCPCS SELF ADMINISTERED DRUGS (ALT 637 FOR MEDICARE OP)

## 2025-01-24 PROCEDURE — 99285 EMERGENCY DEPT VISIT HI MDM: CPT | Mod: 25 | Performed by: EMERGENCY MEDICINE

## 2025-01-24 PROCEDURE — 2500000004 HC RX 250 GENERAL PHARMACY W/ HCPCS (ALT 636 FOR OP/ED)

## 2025-01-24 PROCEDURE — 83735 ASSAY OF MAGNESIUM: CPT

## 2025-01-24 RX ORDER — ONDANSETRON HYDROCHLORIDE 2 MG/ML
4 INJECTION, SOLUTION INTRAVENOUS ONCE
Status: COMPLETED | OUTPATIENT
Start: 2025-01-24 | End: 2025-01-24

## 2025-01-24 RX ORDER — AMLODIPINE BESYLATE 5 MG/1
5 TABLET ORAL ONCE
Status: COMPLETED | OUTPATIENT
Start: 2025-01-24 | End: 2025-01-24

## 2025-01-24 RX ORDER — MORPHINE SULFATE 4 MG/ML
4 INJECTION, SOLUTION INTRAMUSCULAR; INTRAVENOUS ONCE
Status: COMPLETED | OUTPATIENT
Start: 2025-01-24 | End: 2025-01-24

## 2025-01-24 RX ORDER — LISINOPRIL 5 MG/1
5 TABLET ORAL ONCE
Status: COMPLETED | OUTPATIENT
Start: 2025-01-24 | End: 2025-01-24

## 2025-01-24 RX ORDER — ONDANSETRON 4 MG/1
4 TABLET, ORALLY DISINTEGRATING ORAL EVERY 8 HOURS PRN
Qty: 12 TABLET | Refills: 0 | Status: SHIPPED | OUTPATIENT
Start: 2025-01-24

## 2025-01-24 RX ADMIN — ONDANSETRON 4 MG: 2 INJECTION INTRAMUSCULAR; INTRAVENOUS at 18:53

## 2025-01-24 RX ADMIN — IOHEXOL 75 ML: 350 INJECTION, SOLUTION INTRAVENOUS at 16:08

## 2025-01-24 RX ADMIN — ONDANSETRON 4 MG: 2 INJECTION INTRAMUSCULAR; INTRAVENOUS at 13:59

## 2025-01-24 RX ADMIN — MORPHINE SULFATE 4 MG: 4 INJECTION, SOLUTION INTRAMUSCULAR; INTRAVENOUS at 14:00

## 2025-01-24 RX ADMIN — LISINOPRIL 5 MG: 5 TABLET ORAL at 18:53

## 2025-01-24 RX ADMIN — MORPHINE SULFATE 4 MG: 4 INJECTION, SOLUTION INTRAMUSCULAR; INTRAVENOUS at 18:52

## 2025-01-24 RX ADMIN — AMLODIPINE BESYLATE 5 MG: 5 TABLET ORAL at 18:53

## 2025-01-24 RX ADMIN — SODIUM CHLORIDE, POTASSIUM CHLORIDE, SODIUM LACTATE AND CALCIUM CHLORIDE 1000 ML: 600; 310; 30; 20 INJECTION, SOLUTION INTRAVENOUS at 14:00

## 2025-01-24 ASSESSMENT — PAIN SCALES - GENERAL
PAINLEVEL_OUTOF10: 6
PAINLEVEL_OUTOF10: 6
PAINLEVEL_OUTOF10: 0 - NO PAIN
PAINLEVEL_OUTOF10: 6
PAINLEVEL_OUTOF10: 6
PAINLEVEL_OUTOF10: 9

## 2025-01-24 ASSESSMENT — PAIN DESCRIPTION - PAIN TYPE: TYPE: CHRONIC PAIN

## 2025-01-24 ASSESSMENT — PAIN - FUNCTIONAL ASSESSMENT
PAIN_FUNCTIONAL_ASSESSMENT: 0-10
PAIN_FUNCTIONAL_ASSESSMENT: 0-10

## 2025-01-24 ASSESSMENT — LIFESTYLE VARIABLES
HAVE YOU EVER FELT YOU SHOULD CUT DOWN ON YOUR DRINKING: NO
EVER HAD A DRINK FIRST THING IN THE MORNING TO STEADY YOUR NERVES TO GET RID OF A HANGOVER: NO
EVER FELT BAD OR GUILTY ABOUT YOUR DRINKING: NO
HAVE PEOPLE ANNOYED YOU BY CRITICIZING YOUR DRINKING: NO
TOTAL SCORE: 0

## 2025-01-24 ASSESSMENT — PAIN DESCRIPTION - DESCRIPTORS: DESCRIPTORS: CRAMPING

## 2025-01-24 ASSESSMENT — PAIN DESCRIPTION - PROGRESSION: CLINICAL_PROGRESSION: NOT CHANGED

## 2025-01-24 ASSESSMENT — PAIN DESCRIPTION - LOCATION: LOCATION: ABDOMEN

## 2025-01-24 ASSESSMENT — PAIN DESCRIPTION - FREQUENCY: FREQUENCY: CONSTANT/CONTINUOUS

## 2025-01-24 NOTE — ED PROVIDER NOTES
EMERGENCY DEPARTMENT ENCOUNTER      Pt Name: Rubi Leal  MRN: [id number]  Birthdate 1970  Date of evaluation: 1/24/2025  Provider: Joanie Desouza MD    CHIEF COMPLAINT       Chief Complaint   Patient presents with    Nausea    Vomiting         HISTORY OF PRESENT ILLNESS    54-year-old female with PMHx of diabetes mellitus, hypertension, hyperlipidemia, polycythemia, cyclic abdominal pain with nausea/vomiting and recent second-degree left foot burn.  She presented to [facility] ED, abdominal pain, nausea and vomiting.  Patient states her symptoms started yesterday with softer stool, today morning patient go to bathroom 6-7 time with large soft stool, no mucus or blood after that she started to have cramping abdominal pain associated with nausea and vomiting she vomited 3 times today.  Patient reports dizziness however she denies fever, chest pain, shortness of breath and recent infection.  Patient also reports dysuria.  Patient states she had these episodes multiple times last one 2 weeks ago discharged home with Zofran.  She smokes half pack of cigarettes daily and also she smokes marijuana occasionally last one 2 weeks ago.          Nursing Notes were reviewed.    PAST MEDICAL HISTORY     Past Medical History:   Diagnosis Date    Bilateral kidney stones 10/22/2024    Calculus of kidney     Multiple kidney stones    Carpal tunnel syndrome 10/22/2024    Encounter for follow-up examination after completed treatment for conditions other than malignant neoplasm 11/17/2021    Hospital discharge follow-up    Generalized abdominal pain 12/28/2021    Generalized abdominal pain    Hydronephrosis 10/22/2024    Menopausal and female climacteric states 12/17/2019    Perimenopausal    Osteophyte, vertebrae     Osteophyte of spine    Other muscle spasm 02/04/2022    Trapezius muscle spasm    Personal history of other diseases of the musculoskeletal system and connective tissue     History of degenerative disc disease     Personal history of other diseases of the nervous system and sense organs     History of tension headache    Personal history of other specified conditions     History of abdominal pain    Sciatica of left side 10/22/2024    Serous retinal detachment, bilateral     Bilateral retinal detachment    Skin lesion of face 10/22/2024    Strain of muscle, fascia and tendon at neck level, initial encounter 03/09/2022    Strain of sternocleidomastoid muscle, initial encounter    Strain of other muscles, fascia and tendons at shoulder and upper arm level, left arm, initial encounter 03/09/2022    Strain of left trapezius muscle, initial encounter         SURGICAL HISTORY       Past Surgical History:   Procedure Laterality Date    CATARACT EXTRACTION, BILATERAL Bilateral     January, Left Eye & February, Right Eye  2023.    OTHER SURGICAL HISTORY  09/04/2019    Tonsillectomy with adenoidectomy    OTHER SURGICAL HISTORY  09/04/2019    Lithotripsy    OTHER SURGICAL HISTORY  09/04/2019    Carpal tunnel surgery    OTHER SURGICAL HISTORY  02/03/2021    Retinal detachment repair         CURRENT MEDICATIONS       Previous Medications    ALBUTEROL (PROVENTIL HFA) 90 MCG/ACTUATION INHALER    Inhale 2 puffs every 4 hours if needed for wheezing or shortness of breath.    AMLODIPINE (NORVASC) 5 MG TABLET    Take 1 tablet (5 mg) by mouth once daily.    ATORVASTATIN (LIPITOR) 10 MG TABLET    TAKE 1 TABLET BY MOUTH EVERY DAY    BUSPIRONE (BUSPAR) 5 MG TABLET    Take 1 tablet (5 mg) by mouth 2 times a day.    CETIRIZINE (ZYRTEC) 10 MG TABLET    Take 1 tablet (10 mg) by mouth once daily.    CHOLECALCIFEROL, VITAMIN D3, 25 MCG (1,000 UNIT) TABLET,CHEWABLE    Chew 1 tablet (25 mcg) once daily.    FLUTICASONE (FLONASE) 50 MCG/ACTUATION NASAL SPRAY    SPRAY 2 SPRAYS INTO EACH NOSTRIL EVERY DAY    LISINOPRIL 5 MG TABLET    Take 1 tablet (5 mg) by mouth once daily.    PANTOPRAZOLE (PROTONIX) 40 MG EC TABLET    TAKE 1 TABLET BY MOUTH EVERY DAY        ALLERGIES     Cefdinir, Erythromycin, and Penicillins    FAMILY HISTORY     No family history on file.       SOCIAL HISTORY       Social History     Socioeconomic History    Marital status:    Tobacco Use    Smoking status: Every Day     Current packs/day: 0.50     Types: Cigarettes     Passive exposure: Current    Smokeless tobacco: Never   Vaping Use    Vaping status: Never Used   Substance and Sexual Activity    Alcohol use: Yes     Comment: occasionally    Drug use: Yes     Types: Marijuana    Sexual activity: Defer       SCREENINGS                        PHYSICAL EXAM    (up to 7 for level 4, 8 or more for level 5)     ED Triage Vitals [01/24/25 1300]   Temperature Heart Rate Respirations BP   37.1 °C (98.8 °F) 92 20 --      Pulse Ox Temp Source Heart Rate Source Patient Position   99 % Temporal Monitor Sitting      BP Location FiO2 (%)     Right arm --       Physical Exam  Constitutional:       Appearance: Normal appearance.   HENT:      Head: Normocephalic and atraumatic.      Mouth/Throat:      Mouth: Mucous membranes are moist.   Cardiovascular:      Heart sounds: No murmur heard.     No gallop.   Pulmonary:      Effort: No respiratory distress.      Breath sounds: No wheezing or rales.   Abdominal:      General: There is no distension.      Tenderness: There is abdominal tenderness (Lower abdomen). There is no guarding.   Musculoskeletal:      Right lower leg: No edema.      Left lower leg: No edema.   Skin:     General: Skin is warm.      Comments: Second-degree burn at left foot start healing   Neurological:      Mental Status: She is alert and oriented to person, place, and time.      Cranial Nerves: No cranial nerve deficit.      Motor: No weakness.   Psychiatric:         Mood and Affect: Mood normal.          DIAGNOSTIC RESULTS     LABS:  Labs Reviewed - No data to display    All other labs were within normal range or not returned as of this dictation.    Imaging  No orders to display         Procedures  Please see separate procedure documentation for further details.     EMERGENCY DEPARTMENT COURSE/MDM:   Medical Decision Making  54-year-old female with PMHx of diabetes, hypertension, hyperlipidemia, cyclic abdominal pain associated with nausea/vomiting, recent second-degree left foot burn.  She presented to [facility] ED today with cramping lower abdominal pain associated with nausea and vomiting as well as softer stool.  Patient went to bathroom today morning 6-7 times and then started to have crampy abdominal pain associated with nausea and vomiting.  The vomiting 3 times today.  Patient reports dizziness and dysuria.  She denies fever, chest pain, shortness of breath or recent infection.  Note patient has previous history of multiple ED visits with abdominal pain associated nausea and vomiting last time 2 weeks ago.  Patient smokes a pack of cigarettes daily and marijuana occasionally.  Per physical examination there is tenderness in the lower abdomen.  CMP and lipase unremarkable.  Troponin negative X 2.  CBC remarkable for WBC 11.5, Hgb 16.7.  Lactate 4>> 3.1.  CT abdomen revealed  No acute abdominal or pelvic findings.  Patient received morphine and Zofran.  Also she received her home antihypertensive amlodipine and lisinopril.  Patient signed out for incoming team        Please see ED course for additional MDM.    ED Course as of 01/24/25 2126   Fri Jan 24, 2025 2001 On reexamination, patient feels improved.  She is able to tolerate p.o.  She was able to take her home blood pressure medications.  Repeat blood pressure is 175/85.  Patient is sleeping comfortably.  She does feel comfortable plan for discharge.  She will be discharged with prescription for Zofran.  Recommended follow-up with a primary care physician and GI.  She is given return precautions.  Patient understands and agrees with discharge plan. [PS]      ED Course User Index  [PS] Daniel Groves DO         Diagnoses as of  01/24/25 2346   Nausea and vomiting, unspecified vomiting type        No orders to display       Patient and or family in agreement and understanding of treatment plan.  All questions answered.      I reviewed the case with the attending ED physician. The attending ED physician agrees with the plan. Patient and/or patient´s representative was counseled regarding labs, imaging, likely diagnosis, and plan. All questions were answered.    ED Medications administered this visit:  Medications - No data to display    New Prescriptions from this visit:    New Prescriptions    No medications on file       Follow-up:  No follow-up provider specified.      Final Impression: No diagnosis found.      (Please note that portions of this note were completed with a voice recognition program.  Efforts were made to edit the dictations but occasionally words are mis-transcribed.)      Joanie Desouza MD  Resident  01/24/25 1913       Joanie Desouza MD  Resident  01/24/25 1917       Joanie Desouza MD  Resident  01/24/25 2116

## 2025-01-25 LAB
ATRIAL RATE: 87 BPM
P AXIS: 56 DEGREES
P OFFSET: 208 MS
P ONSET: 156 MS
PR INTERVAL: 130 MS
Q ONSET: 221 MS
QRS COUNT: 15 BEATS
QRS DURATION: 84 MS
QT INTERVAL: 352 MS
QTC CALCULATION(BAZETT): 423 MS
QTC FREDERICIA: 398 MS
R AXIS: 42 DEGREES
T AXIS: 35 DEGREES
T OFFSET: 397 MS
VENTRICULAR RATE: 87 BPM

## 2025-01-25 NOTE — DISCHARGE INSTRUCTIONS
Follow-up with your primary care physician.  Take medications as prescribed.  Seek immediate medical attention with any worsening symptoms, worsening pain, inability to tolerate oral intake or for any reason

## 2025-02-06 ENCOUNTER — APPOINTMENT (OUTPATIENT)
Dept: HEMATOLOGY/ONCOLOGY | Facility: CLINIC | Age: 55
End: 2025-02-06
Payer: COMMERCIAL

## 2025-02-07 ENCOUNTER — APPOINTMENT (OUTPATIENT)
Dept: HEMATOLOGY/ONCOLOGY | Facility: CLINIC | Age: 55
End: 2025-02-07
Payer: COMMERCIAL

## 2025-02-18 ENCOUNTER — APPOINTMENT (OUTPATIENT)
Dept: PRIMARY CARE | Facility: CLINIC | Age: 55
End: 2025-02-18
Payer: COMMERCIAL

## 2025-02-18 VITALS
SYSTOLIC BLOOD PRESSURE: 126 MMHG | OXYGEN SATURATION: 96 % | RESPIRATION RATE: 17 BRPM | TEMPERATURE: 98.1 F | DIASTOLIC BLOOD PRESSURE: 76 MMHG | HEIGHT: 65 IN | WEIGHT: 173 LBS | HEART RATE: 59 BPM | BODY MASS INDEX: 28.82 KG/M2

## 2025-02-18 DIAGNOSIS — E78.2 MIXED HYPERLIPIDEMIA: ICD-10-CM

## 2025-02-18 DIAGNOSIS — R11.15 CYCLIC VOMITING SYNDROME: ICD-10-CM

## 2025-02-18 DIAGNOSIS — I10 ESSENTIAL HYPERTENSION: ICD-10-CM

## 2025-02-18 DIAGNOSIS — F17.200 CURRENT EVERY DAY SMOKER: ICD-10-CM

## 2025-02-18 DIAGNOSIS — E55.9 VITAMIN D DEFICIENCY: ICD-10-CM

## 2025-02-18 DIAGNOSIS — E11.9 TYPE 2 DIABETES MELLITUS WITHOUT COMPLICATION, WITHOUT LONG-TERM CURRENT USE OF INSULIN (MULTI): Primary | ICD-10-CM

## 2025-02-18 LAB — POC HEMOGLOBIN A1C: 6.6 % (ref 4.2–6.5)

## 2025-02-18 PROCEDURE — 3074F SYST BP LT 130 MM HG: CPT | Performed by: NURSE PRACTITIONER

## 2025-02-18 PROCEDURE — 3008F BODY MASS INDEX DOCD: CPT | Performed by: NURSE PRACTITIONER

## 2025-02-18 PROCEDURE — 3078F DIAST BP <80 MM HG: CPT | Performed by: NURSE PRACTITIONER

## 2025-02-18 PROCEDURE — 4010F ACE/ARB THERAPY RXD/TAKEN: CPT | Performed by: NURSE PRACTITIONER

## 2025-02-18 PROCEDURE — 99214 OFFICE O/P EST MOD 30 MIN: CPT | Performed by: NURSE PRACTITIONER

## 2025-02-18 PROCEDURE — 83036 HEMOGLOBIN GLYCOSYLATED A1C: CPT | Performed by: NURSE PRACTITIONER

## 2025-02-18 ASSESSMENT — ENCOUNTER SYMPTOMS
NERVOUS/ANXIOUS: 1
FATIGUE: 0
SINUS PRESSURE: 1
DIARRHEA: 1
SHORTNESS OF BREATH: 0
COUGH: 1
SLEEP DISTURBANCE: 0
CHILLS: 0
PALPITATIONS: 0
NAUSEA: 0
CONSTIPATION: 0
DYSPHORIC MOOD: 0
FEVER: 0
VOMITING: 0
WOUND: 0

## 2025-02-18 NOTE — ASSESSMENT & PLAN NOTE
Patient currently smokes 1/2 ppd. She is not currently motivated to quit and wants to work on one health concern at a time. Recommended 1-800-QUIT-NOW. Will discuss at next visit.

## 2025-02-18 NOTE — PROGRESS NOTES
"Subjective   Rubi Leal is a 54 y.o. female who presents to follow-up with Diabetes.    HPI  Review of Systems   Constitutional:  Negative for chills, fatigue and fever.   HENT:  Positive for congestion, postnasal drip and sinus pressure.    Respiratory:  Positive for cough (d/t current nasal congestion). Negative for shortness of breath.    Cardiovascular:  Negative for chest pain, palpitations and leg swelling.   Gastrointestinal:  Positive for diarrhea (pt states d/t her post nasal drip.). Negative for constipation, nausea and vomiting.   Skin:  Negative for wound (had a burn on left foot in 1/2025, now healed.).   Psychiatric/Behavioral:  Negative for dysphoric mood and sleep disturbance. The patient is nervous/anxious (mild).      Objective     Physical Exam  Vitals reviewed.   Constitutional:       General: She is not in acute distress.     Appearance: Normal appearance. She is not ill-appearing.   HENT:      Head: Normocephalic.   Eyes:      Conjunctiva/sclera: Conjunctivae normal.   Cardiovascular:      Rate and Rhythm: Normal rate and regular rhythm.      Pulses: Normal pulses.      Heart sounds: No murmur heard.  Pulmonary:      Effort: Pulmonary effort is normal.      Breath sounds: Normal breath sounds.   Abdominal:      General: Bowel sounds are normal.      Palpations: Abdomen is soft.   Musculoskeletal:      Cervical back: Neck supple.      Right lower leg: No edema.      Left lower leg: No edema.   Skin:     General: Skin is warm and dry.   Neurological:      General: No focal deficit present.      Mental Status: She is alert and oriented to person, place, and time.   Psychiatric:         Mood and Affect: Mood normal.         Thought Content: Thought content normal.     /76   Pulse 59   Temp 36.7 °C (98.1 °F)   Resp 17   Ht 1.651 m (5' 5\")   Wt 78.5 kg (173 lb)   SpO2 96%   BMI 28.79 kg/m²     Assessment/Plan     Problem List Items Addressed This Visit       Essential hypertension " - Primary    Overview     Managed w/ lisinopril 5 mg and amlodipine 5 mg         HLD (hyperlipidemia)    Overview     Managed w/ atorvastatin 10 mg         Vitamin D deficiency    Current Assessment & Plan     Last Vit D level was 28 in fall 2024. She is taking vit d3 1,000 international units daily, but admits she has been inconsistent remembering that.             Cyclic vomiting syndrome    Current Assessment & Plan     Currently doing well, but states she had 2 episodes in the last 4 months.         Type 2 diabetes mellitus without complication, without long-term current use of insulin (Multi)    Overview     New diagnosis 10/28/2024. Hgb A1C 6.7%  Currently diet controlled. Taking a statin and ACEi.         Current Assessment & Plan     She is doing very well without any medication. She has greatly reduced sugar in her diet. She does not do any formal exercise, but she is physically active. 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          Relevant Orders    POCT glycosylated hemoglobin (Hb A1C) manually resulted (Completed)    Current every day smoker    Current Assessment & Plan     Patient currently smokes 1/2 ppd. She is not currently motivated to quit and wants to work on one health concern at a time. Recommended 1-800-[email or web address]. Will discuss at next visit.

## 2025-03-11 DIAGNOSIS — Z87.19 PERSONAL HISTORY OF OTHER DISEASES OF THE DIGESTIVE SYSTEM: ICD-10-CM

## 2025-03-11 DIAGNOSIS — E78.5 HYPERLIPIDEMIA, UNSPECIFIED: ICD-10-CM

## 2025-03-12 RX ORDER — PANTOPRAZOLE SODIUM 40 MG/1
TABLET, DELAYED RELEASE ORAL
Qty: 90 TABLET | Refills: 3 | Status: SHIPPED | OUTPATIENT
Start: 2025-03-12

## 2025-03-12 RX ORDER — ATORVASTATIN CALCIUM 10 MG/1
10 TABLET, FILM COATED ORAL DAILY
Qty: 90 TABLET | Refills: 3 | Status: SHIPPED | OUTPATIENT
Start: 2025-03-12

## 2025-08-04 ENCOUNTER — APPOINTMENT (OUTPATIENT)
Dept: PRIMARY CARE | Facility: CLINIC | Age: 55
End: 2025-08-04
Payer: COMMERCIAL

## 2025-09-11 ENCOUNTER — APPOINTMENT (OUTPATIENT)
Dept: PRIMARY CARE | Facility: CLINIC | Age: 55
End: 2025-09-11
Payer: COMMERCIAL